# Patient Record
Sex: FEMALE | Race: WHITE | Employment: FULL TIME | URBAN - NONMETROPOLITAN AREA
[De-identification: names, ages, dates, MRNs, and addresses within clinical notes are randomized per-mention and may not be internally consistent; named-entity substitution may affect disease eponyms.]

---

## 2019-12-10 ENCOUNTER — HOSPITAL ENCOUNTER (OUTPATIENT)
Dept: MAMMOGRAPHY | Age: 54
Discharge: HOME | End: 2019-12-10
Payer: COMMERCIAL

## 2019-12-10 DIAGNOSIS — Z12.39 BREAST CANCER SCREENING: ICD-10-CM

## 2019-12-10 PROCEDURE — 77063 BREAST TOMOSYNTHESIS BI: CPT

## 2020-12-16 ENCOUNTER — HOSPITAL ENCOUNTER (OUTPATIENT)
Dept: MAMMOGRAPHY | Age: 55
Discharge: HOME | End: 2020-12-16
Payer: COMMERCIAL

## 2020-12-16 PROCEDURE — 77063 BREAST TOMOSYNTHESIS BI: CPT

## 2022-01-14 ENCOUNTER — HOSPITAL ENCOUNTER (OUTPATIENT)
Dept: GENERAL RADIOLOGY | Age: 57
Discharge: HOME | End: 2022-01-14
Payer: COMMERCIAL

## 2022-01-14 ENCOUNTER — HOSPITAL ENCOUNTER (OUTPATIENT)
Age: 57
Discharge: HOME | End: 2022-01-14
Payer: COMMERCIAL

## 2022-01-14 DIAGNOSIS — Z01.818 PREOP TESTING: ICD-10-CM

## 2022-01-14 DIAGNOSIS — M20.11 HALLUX VALGUS (ACQUIRED), RIGHT FOOT: ICD-10-CM

## 2022-01-14 DIAGNOSIS — M20.41 HAMMER TOE OF RIGHT FOOT: ICD-10-CM

## 2022-01-14 LAB
ALBUMIN SERPL-MCNC: 4.6 G/DL (ref 3.5–5.1)
ALP BLD-CCNC: 76 U/L (ref 38–126)
ALT SERPL-CCNC: 9 U/L (ref 11–66)
ANION GAP SERPL CALCULATED.3IONS-SCNC: 10 MEQ/L (ref 8–16)
AST SERPL-CCNC: 14 U/L (ref 5–40)
BILIRUB SERPL-MCNC: 0.4 MG/DL (ref 0.3–1.2)
BUN BLDV-MCNC: 17 MG/DL (ref 7–22)
CALCIUM SERPL-MCNC: 9.5 MG/DL (ref 8.5–10.5)
CHLORIDE BLD-SCNC: 104 MEQ/L (ref 98–111)
CO2: 25 MEQ/L (ref 23–33)
CREAT SERPL-MCNC: 0.6 MG/DL (ref 0.4–1.2)
EKG ATRIAL RATE: 64 BPM
EKG P AXIS: 65 DEGREES
EKG P-R INTERVAL: 162 MS
EKG Q-T INTERVAL: 408 MS
EKG QRS DURATION: 84 MS
EKG QTC CALCULATION (BAZETT): 420 MS
EKG R AXIS: 51 DEGREES
EKG T AXIS: 46 DEGREES
EKG VENTRICULAR RATE: 64 BPM
ERYTHROCYTE [DISTWIDTH] IN BLOOD BY AUTOMATED COUNT: 12.1 % (ref 11.5–14.5)
ERYTHROCYTE [DISTWIDTH] IN BLOOD BY AUTOMATED COUNT: 40.3 FL (ref 35–45)
GFR SERPL CREATININE-BSD FRML MDRD: > 90 ML/MIN/1.73M2
GLUCOSE BLD-MCNC: 78 MG/DL (ref 70–108)
HCT VFR BLD CALC: 43 % (ref 37–47)
HEMOGLOBIN: 14.6 GM/DL (ref 12–16)
MCH RBC QN AUTO: 31 PG (ref 26–33)
MCHC RBC AUTO-ENTMCNC: 34 GM/DL (ref 32.2–35.5)
MCV RBC AUTO: 91.3 FL (ref 81–99)
PLATELET # BLD: 205 THOU/MM3 (ref 130–400)
PMV BLD AUTO: 10 FL (ref 9.4–12.4)
POTASSIUM SERPL-SCNC: 4.3 MEQ/L (ref 3.5–5.2)
RBC # BLD: 4.71 MILL/MM3 (ref 4.2–5.4)
SODIUM BLD-SCNC: 139 MEQ/L (ref 135–145)
TOTAL PROTEIN: 7.3 G/DL (ref 6.1–8)
WBC # BLD: 7.1 THOU/MM3 (ref 4.8–10.8)

## 2022-01-14 PROCEDURE — 36415 COLL VENOUS BLD VENIPUNCTURE: CPT

## 2022-01-14 PROCEDURE — 80053 COMPREHEN METABOLIC PANEL: CPT

## 2022-01-14 PROCEDURE — 93005 ELECTROCARDIOGRAM TRACING: CPT | Performed by: PODIATRIST

## 2022-01-14 PROCEDURE — 85027 COMPLETE CBC AUTOMATED: CPT

## 2022-01-14 PROCEDURE — 71046 X-RAY EXAM CHEST 2 VIEWS: CPT

## 2022-01-19 RX ORDER — FLUOXETINE HYDROCHLORIDE 20 MG/1
20 CAPSULE ORAL DAILY
COMMUNITY
Start: 2021-12-30

## 2022-01-19 RX ORDER — ALPRAZOLAM 0.5 MG/1
TABLET ORAL 3 TIMES DAILY PRN
COMMUNITY
Start: 2021-12-30 | End: 2022-09-02 | Stop reason: DRUGHIGH

## 2022-01-19 RX ORDER — TRAZODONE HYDROCHLORIDE 100 MG/1
100 TABLET ORAL NIGHTLY
COMMUNITY
Start: 2021-08-30 | End: 2022-09-02 | Stop reason: DRUGHIGH

## 2022-01-19 NOTE — PROGRESS NOTES
In preparation for their surgical procedure above patient was screened for Obstructive Sleep Apnea (CONCEPCION) using the STOP-Bang Questionnaire by the Pre-Admission Testing department. This is a pre-surgical screening tool for patient safety and serves as a recommendation, this WILL NOT cause cancellation of surgery. STOP-Bang Questionnaire  * Do you currently see a pulmonologist?  No     If yes STOP, do not complete. Patient follows with Dr.     1.  Do you snore loudly (able to be heard in the next room)? No    2. Do you often feel tired or sleepy during the daytime? No       3. Has anyone ever told you that you stop breathing during your sleep? No    4. Do you have or are you being treated for high blood pressure? No      5. BMI more than 35? BMI (Calculated): 25.5        No    6. Age over 48 years? 61 y.o. Yes    7. Neck Circumference greater than 17 inches for male or 16 inches for female? Measured           (visits only)            Not Applicable    8. Gender Male? No      TOTAL SCORE: 1    CONCEPCION - Low Risk : Yes to 0 - 2 questions  CONCEPCION - Intermediate Risk : Yes to 3 - 4 questions  CONCEPCION - High Risk : Yes to 5 - 8 questions    Adapted from:   STOP Questionnaire: A Tool to Screen Patients for Obstructive Sleep Apnea   DEACON Gunn.C.P.C., Reyes Kline M.B.B.S., Lisa Boles M.D., Mj Marin. Meaghan Deutsch, Ph.D., Alanna Suarez M.B.B.S., He Deluca M.Sc., Luciana Hope M.D., Lorna Mendoza. JOHN Johnson.P.C.    Anesthesiology 2008; 925:150-75 Copyright 2008, the [address] of [employer]esthesi[employer], [facility] 37.   ----------------------------------------------------------------------------------------------------------------

## 2022-01-19 NOTE — PROGRESS NOTES
Covid screening questionnaire complete and negative for symptoms or exposure see chart for documentation.   Please notify your doctor if you develop any signs of illness  Please wear a mask when you come to the hospital    NPO after midnight  Bring insurance info and drivers license  Wear comfortable clean clothing  Do not bring jewelry  Shower night before and morning of surgery with a liquid antibacterial soap  Bring list of medications with dosage and how often taken  Follow all instructions given by your physician   needed at discharge  No visitors allowed in with patient at this time  Please bring and wear mask  Call [phone] for any questions

## 2022-01-26 ENCOUNTER — ANESTHESIA EVENT (OUTPATIENT)
Dept: OPERATING ROOM | Age: 57
End: 2022-01-26
Payer: COMMERCIAL

## 2022-01-26 ENCOUNTER — HOSPITAL ENCOUNTER (OUTPATIENT)
Age: 57
Setting detail: OUTPATIENT SURGERY
Discharge: HOME | End: 2022-01-26
Attending: PODIATRIST | Admitting: PODIATRIST
Payer: COMMERCIAL

## 2022-01-26 ENCOUNTER — ANESTHESIA (OUTPATIENT)
Dept: OPERATING ROOM | Age: 57
End: 2022-01-26
Payer: COMMERCIAL

## 2022-01-26 VITALS
SYSTOLIC BLOOD PRESSURE: 108 MMHG | TEMPERATURE: 98.3 F | HEART RATE: 88 BPM | OXYGEN SATURATION: 93 % | HEIGHT: 60 IN | WEIGHT: 125 LBS | RESPIRATION RATE: 16 BRPM | BODY MASS INDEX: 24.54 KG/M2 | DIASTOLIC BLOOD PRESSURE: 60 MMHG

## 2022-01-26 VITALS
RESPIRATION RATE: 17 BRPM | DIASTOLIC BLOOD PRESSURE: 59 MMHG | OXYGEN SATURATION: 97 % | SYSTOLIC BLOOD PRESSURE: 112 MMHG

## 2022-01-26 PROCEDURE — 6370000000 HC RX 637 (ALT 250 FOR IP)

## 2022-01-26 PROCEDURE — 3700000001 HC ADD 15 MINUTES (ANESTHESIA): Performed by: PODIATRIST

## 2022-01-26 PROCEDURE — C1713 ANCHOR/SCREW BN/BN,TIS/BN: HCPCS | Performed by: PODIATRIST

## 2022-01-26 PROCEDURE — 6360000002 HC RX W HCPCS: Performed by: REGISTERED NURSE

## 2022-01-26 PROCEDURE — 2500000003 HC RX 250 WO HCPCS: Performed by: REGISTERED NURSE

## 2022-01-26 PROCEDURE — 3600000004 HC SURGERY LEVEL 4 BASE: Performed by: PODIATRIST

## 2022-01-26 PROCEDURE — 6360000002 HC RX W HCPCS: Performed by: STUDENT IN AN ORGANIZED HEALTH CARE EDUCATION/TRAINING PROGRAM

## 2022-01-26 PROCEDURE — 88304 TISSUE EXAM BY PATHOLOGIST: CPT

## 2022-01-26 PROCEDURE — 3600000014 HC SURGERY LEVEL 4 ADDTL 15MIN: Performed by: PODIATRIST

## 2022-01-26 PROCEDURE — 7100000011 HC PHASE II RECOVERY - ADDTL 15 MIN: Performed by: PODIATRIST

## 2022-01-26 PROCEDURE — 2709999900 HC NON-CHARGEABLE SUPPLY: Performed by: PODIATRIST

## 2022-01-26 PROCEDURE — 2580000003 HC RX 258: Performed by: STUDENT IN AN ORGANIZED HEALTH CARE EDUCATION/TRAINING PROGRAM

## 2022-01-26 PROCEDURE — 7100000001 HC PACU RECOVERY - ADDTL 15 MIN: Performed by: PODIATRIST

## 2022-01-26 PROCEDURE — 7100000000 HC PACU RECOVERY - FIRST 15 MIN: Performed by: PODIATRIST

## 2022-01-26 PROCEDURE — 2720000010 HC SURG SUPPLY STERILE: Performed by: PODIATRIST

## 2022-01-26 PROCEDURE — 3700000000 HC ANESTHESIA ATTENDED CARE: Performed by: PODIATRIST

## 2022-01-26 PROCEDURE — 7100000010 HC PHASE II RECOVERY - FIRST 15 MIN: Performed by: PODIATRIST

## 2022-01-26 PROCEDURE — 2500000003 HC RX 250 WO HCPCS: Performed by: PODIATRIST

## 2022-01-26 DEVICE — STRAIGHT STAPLE ASSEMBLY, 25 X 20MM
Type: IMPLANTABLE DEVICE | Status: FUNCTIONAL
Brand: JAWS NITINOL STAPLE SYSTEM

## 2022-01-26 DEVICE — GRAFT HUM TISS W3XL6CM CRYOPRESERVED PLCNTA TISS UMB AMNION: Type: IMPLANTABLE DEVICE | Status: FUNCTIONAL

## 2022-01-26 DEVICE — KIT STPL BRDG 18MM LEG 18MM MAX CLSR 10.4MM BME ELITE: Type: IMPLANTABLE DEVICE | Status: FUNCTIONAL

## 2022-01-26 DEVICE — STAPLE BNE FIX W9XH10MM WIRE 1.5X1.5MM HND WRST NIT CONT: Type: IMPLANTABLE DEVICE | Status: FUNCTIONAL

## 2022-01-26 RX ORDER — MIDAZOLAM HYDROCHLORIDE 1 MG/ML
INJECTION INTRAMUSCULAR; INTRAVENOUS PRN
Status: DISCONTINUED | OUTPATIENT
Start: 2022-01-26 | End: 2022-01-26 | Stop reason: SDUPTHER

## 2022-01-26 RX ORDER — KETOROLAC TROMETHAMINE 30 MG/ML
INJECTION, SOLUTION INTRAMUSCULAR; INTRAVENOUS PRN
Status: DISCONTINUED | OUTPATIENT
Start: 2022-01-26 | End: 2022-01-26 | Stop reason: SDUPTHER

## 2022-01-26 RX ORDER — DEXAMETHASONE SODIUM PHOSPHATE 10 MG/ML
INJECTION, EMULSION INTRAMUSCULAR; INTRAVENOUS PRN
Status: DISCONTINUED | OUTPATIENT
Start: 2022-01-26 | End: 2022-01-26 | Stop reason: SDUPTHER

## 2022-01-26 RX ORDER — DIPHENHYDRAMINE HYDROCHLORIDE 50 MG/ML
12.5 INJECTION INTRAMUSCULAR; INTRAVENOUS
Status: DISCONTINUED | OUTPATIENT
Start: 2022-01-26 | End: 2022-01-26 | Stop reason: HOSPADM

## 2022-01-26 RX ORDER — LABETALOL 20 MG/4 ML (5 MG/ML) INTRAVENOUS SYRINGE
5 EVERY 10 MIN PRN
Status: DISCONTINUED | OUTPATIENT
Start: 2022-01-26 | End: 2022-01-26 | Stop reason: HOSPADM

## 2022-01-26 RX ORDER — METOCLOPRAMIDE HYDROCHLORIDE 5 MG/ML
10 INJECTION INTRAMUSCULAR; INTRAVENOUS
Status: DISCONTINUED | OUTPATIENT
Start: 2022-01-26 | End: 2022-01-26 | Stop reason: HOSPADM

## 2022-01-26 RX ORDER — SODIUM CHLORIDE 0.9 % (FLUSH) 0.9 %
10 SYRINGE (ML) INJECTION EVERY 12 HOURS SCHEDULED
Status: DISCONTINUED | OUTPATIENT
Start: 2022-01-26 | End: 2022-01-26 | Stop reason: HOSPADM

## 2022-01-26 RX ORDER — FENTANYL CITRATE 50 UG/ML
INJECTION, SOLUTION INTRAMUSCULAR; INTRAVENOUS PRN
Status: DISCONTINUED | OUTPATIENT
Start: 2022-01-26 | End: 2022-01-26 | Stop reason: SDUPTHER

## 2022-01-26 RX ORDER — SCOLOPAMINE TRANSDERMAL SYSTEM 1 MG/1
PATCH, EXTENDED RELEASE TRANSDERMAL
Status: DISCONTINUED
Start: 2022-01-26 | End: 2022-01-26 | Stop reason: HOSPADM

## 2022-01-26 RX ORDER — SODIUM CHLORIDE 9 MG/ML
INJECTION, SOLUTION INTRAVENOUS CONTINUOUS
Status: DISCONTINUED | OUTPATIENT
Start: 2022-01-26 | End: 2022-01-26 | Stop reason: HOSPADM

## 2022-01-26 RX ORDER — ONDANSETRON 2 MG/ML
4 INJECTION INTRAMUSCULAR; INTRAVENOUS EVERY 6 HOURS PRN
Status: DISCONTINUED | OUTPATIENT
Start: 2022-01-26 | End: 2022-01-26 | Stop reason: HOSPADM

## 2022-01-26 RX ORDER — LIDOCAINE HYDROCHLORIDE AND EPINEPHRINE 10; 10 MG/ML; UG/ML
INJECTION, SOLUTION INFILTRATION; PERINEURAL PRN
Status: DISCONTINUED | OUTPATIENT
Start: 2022-01-26 | End: 2022-01-26 | Stop reason: ALTCHOICE

## 2022-01-26 RX ORDER — PROMETHAZINE HYDROCHLORIDE 25 MG/ML
12.5 INJECTION, SOLUTION INTRAMUSCULAR; INTRAVENOUS
Status: DISCONTINUED | OUTPATIENT
Start: 2022-01-26 | End: 2022-01-26 | Stop reason: HOSPADM

## 2022-01-26 RX ORDER — OXYCODONE HYDROCHLORIDE 5 MG/1
5 TABLET ORAL EVERY 4 HOURS PRN
Status: DISCONTINUED | OUTPATIENT
Start: 2022-01-26 | End: 2022-01-26 | Stop reason: HOSPADM

## 2022-01-26 RX ORDER — PROMETHAZINE HYDROCHLORIDE 25 MG/ML
INJECTION, SOLUTION INTRAMUSCULAR; INTRAVENOUS PRN
Status: DISCONTINUED | OUTPATIENT
Start: 2022-01-26 | End: 2022-01-26 | Stop reason: SDUPTHER

## 2022-01-26 RX ORDER — SODIUM CHLORIDE 0.9 % (FLUSH) 0.9 %
10 SYRINGE (ML) INJECTION PRN
Status: DISCONTINUED | OUTPATIENT
Start: 2022-01-26 | End: 2022-01-26 | Stop reason: HOSPADM

## 2022-01-26 RX ORDER — MEPERIDINE HYDROCHLORIDE 25 MG/ML
12.5 INJECTION INTRAMUSCULAR; INTRAVENOUS; SUBCUTANEOUS EVERY 5 MIN PRN
Status: DISCONTINUED | OUTPATIENT
Start: 2022-01-26 | End: 2022-01-26 | Stop reason: HOSPADM

## 2022-01-26 RX ORDER — ONDANSETRON 2 MG/ML
INJECTION INTRAMUSCULAR; INTRAVENOUS PRN
Status: DISCONTINUED | OUTPATIENT
Start: 2022-01-26 | End: 2022-01-26 | Stop reason: SDUPTHER

## 2022-01-26 RX ORDER — SODIUM CHLORIDE 9 MG/ML
25 INJECTION, SOLUTION INTRAVENOUS PRN
Status: DISCONTINUED | OUTPATIENT
Start: 2022-01-26 | End: 2022-01-26 | Stop reason: HOSPADM

## 2022-01-26 RX ORDER — FENTANYL CITRATE 50 UG/ML
50 INJECTION, SOLUTION INTRAMUSCULAR; INTRAVENOUS EVERY 5 MIN PRN
Status: DISCONTINUED | OUTPATIENT
Start: 2022-01-26 | End: 2022-01-26 | Stop reason: HOSPADM

## 2022-01-26 RX ORDER — EPHEDRINE SULFATE/0.9% NACL/PF 50 MG/5 ML
SYRINGE (ML) INTRAVENOUS PRN
Status: DISCONTINUED | OUTPATIENT
Start: 2022-01-26 | End: 2022-01-26 | Stop reason: SDUPTHER

## 2022-01-26 RX ORDER — OXYCODONE HYDROCHLORIDE 5 MG/1
10 TABLET ORAL EVERY 4 HOURS PRN
Status: DISCONTINUED | OUTPATIENT
Start: 2022-01-26 | End: 2022-01-26 | Stop reason: HOSPADM

## 2022-01-26 RX ORDER — BUPIVACAINE HYDROCHLORIDE 5 MG/ML
INJECTION, SOLUTION EPIDURAL; INTRACAUDAL PRN
Status: DISCONTINUED | OUTPATIENT
Start: 2022-01-26 | End: 2022-01-26 | Stop reason: ALTCHOICE

## 2022-01-26 RX ORDER — ESMOLOL HYDROCHLORIDE 10 MG/ML
INJECTION INTRAVENOUS PRN
Status: DISCONTINUED | OUTPATIENT
Start: 2022-01-26 | End: 2022-01-26 | Stop reason: SDUPTHER

## 2022-01-26 RX ORDER — PROPOFOL 10 MG/ML
INJECTION, EMULSION INTRAVENOUS PRN
Status: DISCONTINUED | OUTPATIENT
Start: 2022-01-26 | End: 2022-01-26 | Stop reason: SDUPTHER

## 2022-01-26 RX ORDER — ONDANSETRON 4 MG/1
4 TABLET, ORALLY DISINTEGRATING ORAL EVERY 8 HOURS PRN
Status: DISCONTINUED | OUTPATIENT
Start: 2022-01-26 | End: 2022-01-26 | Stop reason: HOSPADM

## 2022-01-26 RX ORDER — FENTANYL CITRATE 50 UG/ML
25 INJECTION, SOLUTION INTRAMUSCULAR; INTRAVENOUS EVERY 5 MIN PRN
Status: DISCONTINUED | OUTPATIENT
Start: 2022-01-26 | End: 2022-01-26 | Stop reason: HOSPADM

## 2022-01-26 RX ORDER — SCOLOPAMINE TRANSDERMAL SYSTEM 1 MG/1
1 PATCH, EXTENDED RELEASE TRANSDERMAL
Status: DISCONTINUED | OUTPATIENT
Start: 2022-01-26 | End: 2022-01-26 | Stop reason: HOSPADM

## 2022-01-26 RX ORDER — LIDOCAINE HYDROCHLORIDE 20 MG/ML
INJECTION, SOLUTION EPIDURAL; INFILTRATION; INTRACAUDAL; PERINEURAL PRN
Status: DISCONTINUED | OUTPATIENT
Start: 2022-01-26 | End: 2022-01-26 | Stop reason: SDUPTHER

## 2022-01-26 RX ADMIN — CEFAZOLIN 2000 MG: 10 INJECTION, POWDER, FOR SOLUTION INTRAVENOUS at 08:55

## 2022-01-26 RX ADMIN — PROPOFOL 20 MG: 10 INJECTION, EMULSION INTRAVENOUS at 09:00

## 2022-01-26 RX ADMIN — ESMOLOL HYDROCHLORIDE 20 MG: 10 INJECTION, SOLUTION INTRAVENOUS at 11:27

## 2022-01-26 RX ADMIN — ESMOLOL HYDROCHLORIDE 20 MG: 10 INJECTION, SOLUTION INTRAVENOUS at 11:07

## 2022-01-26 RX ADMIN — PROPOFOL 80 MG: 10 INJECTION, EMULSION INTRAVENOUS at 10:08

## 2022-01-26 RX ADMIN — LIDOCAINE HYDROCHLORIDE 40 MG: 20 INJECTION, SOLUTION EPIDURAL; INFILTRATION; INTRACAUDAL; PERINEURAL at 08:49

## 2022-01-26 RX ADMIN — PROPOFOL 80 MG: 10 INJECTION, EMULSION INTRAVENOUS at 09:05

## 2022-01-26 RX ADMIN — FENTANYL CITRATE 25 MCG: 50 INJECTION, SOLUTION INTRAMUSCULAR; INTRAVENOUS at 10:42

## 2022-01-26 RX ADMIN — PROPOFOL 100 MG: 10 INJECTION, EMULSION INTRAVENOUS at 08:49

## 2022-01-26 RX ADMIN — Medication 20 MG: at 10:21

## 2022-01-26 RX ADMIN — FENTANYL CITRATE 50 MCG: 50 INJECTION, SOLUTION INTRAMUSCULAR; INTRAVENOUS at 09:04

## 2022-01-26 RX ADMIN — FENTANYL CITRATE 50 MCG: 50 INJECTION, SOLUTION INTRAMUSCULAR; INTRAVENOUS at 11:17

## 2022-01-26 RX ADMIN — PROPOFOL 30 MG: 10 INJECTION, EMULSION INTRAVENOUS at 10:33

## 2022-01-26 RX ADMIN — PROPOFOL 50 MG: 10 INJECTION, EMULSION INTRAVENOUS at 10:49

## 2022-01-26 RX ADMIN — FENTANYL CITRATE 25 MCG: 50 INJECTION, SOLUTION INTRAMUSCULAR; INTRAVENOUS at 10:33

## 2022-01-26 RX ADMIN — FENTANYL CITRATE 50 MCG: 50 INJECTION, SOLUTION INTRAMUSCULAR; INTRAVENOUS at 08:46

## 2022-01-26 RX ADMIN — ESMOLOL HYDROCHLORIDE 20 MG: 10 INJECTION, SOLUTION INTRAVENOUS at 11:15

## 2022-01-26 RX ADMIN — FENTANYL CITRATE 25 MCG: 50 INJECTION, SOLUTION INTRAMUSCULAR; INTRAVENOUS at 11:41

## 2022-01-26 RX ADMIN — KETOROLAC TROMETHAMINE 30 MG: 30 INJECTION, SOLUTION INTRAMUSCULAR; INTRAVENOUS at 10:58

## 2022-01-26 RX ADMIN — ONDANSETRON 4 MG: 2 INJECTION INTRAMUSCULAR; INTRAVENOUS at 08:49

## 2022-01-26 RX ADMIN — ESMOLOL HYDROCHLORIDE 20 MG: 10 INJECTION, SOLUTION INTRAVENOUS at 11:35

## 2022-01-26 RX ADMIN — FENTANYL CITRATE 50 MCG: 50 INJECTION, SOLUTION INTRAMUSCULAR; INTRAVENOUS at 10:08

## 2022-01-26 RX ADMIN — Medication 20 MG: at 09:16

## 2022-01-26 RX ADMIN — SODIUM CHLORIDE: 9 INJECTION, SOLUTION INTRAVENOUS at 08:46

## 2022-01-26 RX ADMIN — DEXAMETHASONE SODIUM PHOSPHATE 10 MG: 10 INJECTION, EMULSION INTRAMUSCULAR; INTRAVENOUS at 08:49

## 2022-01-26 RX ADMIN — Medication 10 MG: at 10:18

## 2022-01-26 RX ADMIN — MIDAZOLAM 2 MG: 1 INJECTION INTRAMUSCULAR; INTRAVENOUS at 08:46

## 2022-01-26 RX ADMIN — PROPOFOL 30 MCG/KG/MIN: 10 INJECTION, EMULSION INTRAVENOUS at 11:10

## 2022-01-26 RX ADMIN — PROMETHAZINE HYDROCHLORIDE 6.25 MG: 25 INJECTION INTRAMUSCULAR; INTRAVENOUS at 09:09

## 2022-01-26 RX ADMIN — FENTANYL CITRATE 25 MCG: 50 INJECTION, SOLUTION INTRAMUSCULAR; INTRAVENOUS at 11:55

## 2022-01-26 RX ADMIN — SODIUM CHLORIDE: 9 INJECTION, SOLUTION INTRAVENOUS at 10:25

## 2022-01-26 ASSESSMENT — PAIN SCALES - GENERAL: PAINLEVEL_OUTOF10: 0

## 2022-01-26 ASSESSMENT — PULMONARY FUNCTION TESTS
PIF_VALUE: 2
PIF_VALUE: 0
PIF_VALUE: 2
PIF_VALUE: 2
PIF_VALUE: 1
PIF_VALUE: 2
PIF_VALUE: 1
PIF_VALUE: 2
PIF_VALUE: 3
PIF_VALUE: 2
PIF_VALUE: 13
PIF_VALUE: 2
PIF_VALUE: 1
PIF_VALUE: 2
PIF_VALUE: 0
PIF_VALUE: 2
PIF_VALUE: 2
PIF_VALUE: 13
PIF_VALUE: 2
PIF_VALUE: 3
PIF_VALUE: 2
PIF_VALUE: 3
PIF_VALUE: 2
PIF_VALUE: 9
PIF_VALUE: 2
PIF_VALUE: 1
PIF_VALUE: 2
PIF_VALUE: 3
PIF_VALUE: 2
PIF_VALUE: 0
PIF_VALUE: 2
PIF_VALUE: 1
PIF_VALUE: 2
PIF_VALUE: 3
PIF_VALUE: 2
PIF_VALUE: 3
PIF_VALUE: 2
PIF_VALUE: 3

## 2022-01-26 ASSESSMENT — PAIN - FUNCTIONAL ASSESSMENT: PAIN_FUNCTIONAL_ASSESSMENT: 0-10

## 2022-01-26 NOTE — ANESTHESIA PRE PROCEDURE
Department of Anesthesiology  Preprocedure Note       Name:  Arvind Lockwood   Age:  61 y.o.  :  1965                                          MRN:  [id number]         Date:  2022      Surgeon: Jonnie Rangel):  Edilberto Byers DPM    Procedure: Procedure(s):  1ST METATARSOCUNEIFORM ARTHRODESIS WITH TITANIUM WEDGE STAPLES RIGHT FOOT DISTAL CHEVRON BUNIONECTOMY WITH SCREW FIXATION RIGHT FOOT POSSIBLE AKIN OSTEOTOMY WITH STAPLE FIXATION RIGHT FOOT PERCUTANEOUS FLEXOR TENOTOMY DIGIT 2, 3, 4, 5 RIGHT FOOT EXCISION OF PLANTAR FIBROMA RIGHT FOOT    Medications prior to admission:   Prior to Admission medications    Medication Sig Start Date End Date Taking? Authorizing Provider   ALPRAZolam Teagan Riling) 0.5 MG tablet 3 times daily as needed. 21  Yes Historical Provider, MD   traZODone (DESYREL) 100 MG tablet Take 100 mg by mouth nightly 21  Yes Historical Provider, MD   FLUoxetine (PROZAC) 20 MG capsule Take 20 mg by mouth daily 21  Yes Historical Provider, MD       Current medications:    Current Facility-Administered Medications   Medication Dose Route Frequency Provider Last Rate Last Admin    sodium chloride flush 0.9 % injection 10 mL  10 mL IntraVENous 2 times per day Roxann Lacrosse, DPM        sodium chloride flush 0.9 % injection 10 mL  10 mL IntraVENous PRN Roxann Lacrosse, DPM        0.9 % sodium chloride infusion  25 mL IntraVENous PRN Roxann Lacrosse, DPM        ceFAZolin (ANCEF) 2000 mg in dextrose 5 % 50 mL IVPB  2,000 mg IntraVENous On Call to [facility], [facility]           Allergies: Allergies   Allergen Reactions    Doxycycline Nausea And Vomiting       Problem List:  There is no problem list on file for this patient.       Past Medical History:        Diagnosis Date    PFO (patent foramen ovale)     congential and closed    PONV (postoperative nausea and vomiting)        Past Surgical History:        Procedure Laterality Date     SECTION      x2    COLONOSCOPY [facility]    Drug/Infectious Status (If Applicable):  No results found for: HIV, HEPCAB    COVID-19 Screening (If Applicable): No results found for: COVID19        Anesthesia Evaluation  Patient summary reviewed   history of anesthetic complications: PONV. Airway: Mallampati: II  TM distance: >3 FB   Neck ROM: full  Mouth opening: > = 3 FB Dental: normal exam         Pulmonary:normal exam                               Cardiovascular:                      Neuro/Psych:               GI/Hepatic/Renal:             Endo/Other:                     Abdominal:             Vascular: Other Findings:             Anesthesia Plan      general     ASA 2       Induction: intravenous. MIPS: Postoperative opioids intended and Prophylactic antiemetics administered. Anesthetic plan and risks discussed with patient and spouse.       Plan discussed with CRNA and surgical team.                  Glen Dias MD   1/26/2022

## 2022-01-26 NOTE — PROGRESS NOTES
1216 Patient arrived to phase I via cart. Spontaneous respiraitons even and unlabored. Placed on monitor--VSS. Report received from Melissa Ochoa Dr Assessment completed. Patient remains drowsy and unarousable. IV infusing-- no complications. Unable to assess pain at this time D/T decreased LOC--will monitor. Right foot is elevated on 2 pillows. Toes are pink, warm, and dry. [facility] remains at bedside  1229 O2 decreased to 5 LPM via NC  1230 Pt. Awake and talking, but easily falls back to sleep. 1231 Pt. forgetful and needs reoriented when she wakes. 1232 O2 removed per pt. SPO2 92% on RA.  1234 Pt. Denies pain at this time, just states that she is tired. 1235 SPO2 remains at 92% on RA.  1238 Pt. Requesting to void. RN offered a bedpan. Pt. Agreed. 1239 Pt. Voiding in bedpan without complications  5818 Pt. Requesting water. Water provided. Pt. Sipping on water, and denies any N/V. Pt. Denies pain at this time. 1248 Pt. Able to move toes, but denies sensation. 1252 Pt. Alert and oriented. 1257 Phase I care complete. Pt. Moved to room 1 for Phase II care  1304 Pt. Alert and oriented X4 and talking with her . Pt. Denies pain at this time and requesting a snack. 1310 Snack and drink provided for pt.  1315 Pt. Tolerating snack without complaints. 1325 Pt. States she is ready to be discharged. IV removed. [address] at bedside assisting pt to get dressed. 1333 Pt. States she has to void prior to discharge. 1334 PT. Assisted into wheelchair and assisted to the BR.  1340 Pt. Voided and assisted back to wheelchair with stand by assist of RN. 1348 Discharge instructions reviewed with the pt. And her Spouse. All questions answered and an ice pack provided. 1352 Pt. Transferred to private vehicle in stable condition via wheelchair. RN assisted the pt. Into her vehicle. Pt. Tolerated activity well and without complaints.

## 2022-01-26 NOTE — BRIEF OP NOTE
20MM  STAPLE STR ASSEMB 25MM X 20MM  PARAGON 28-WD [id number] Right 1 Implanted   STAPLE BNE FIX [id number] WIRE 1.5X1.5MM HND WRST NIT CONT  STAPLE BNE FIX [id number] WIRE 1.5X1.5MM HND WRST NIT CONT  [employer] [place]-[employer] [id number] Right 1 Implanted         Drains: * No LDAs found *    Suture: 3-0 vicryl, 4-0 prolene    Injectable: 40cc 1:1 mix of 0.5% marcaine plain and 1% lidocaine with epi    Findings: Consistent with diagnosis    Electronically signed by Wesley Quan DPM on 1/26/2022 at 12:20 PM

## 2022-01-26 NOTE — OP NOTE
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 SUBSEQUENT ENCOUNTER HAMMER TOE(S) ACQUIRED RIGHT FOOT OTHER DEFORMITIES OF TOE(S) ACQUIRED RIGHT FOOT PLANTAR FIBROMATOSIS/PSUEDOSARCOMATOUS FIBROMATOSIS    Post-Op Diagnosis: Same       Procedure(s):  1ST METATARSOCUNEIFORM ARTHRODESIS WITH TITANIUM WEDGE STAPLES RIGHT FOOT, HERRON BUNIONECTOMY, FOOT AKIN OSTEOTOMY WITH STAPLE FIXATION RIGHT FOOT PERCUTANEOUS FLEXOR TENOTOMY DIGIT 2, 3, 4, 5 RIGHT FOOT EXCISION OF PLANTAR FIBROMA RIGHT FOOT    Surgeon(s):  Edilberto Guzman DPM    Assistant:  Resident: Fior Gardner DPM; Scottie Shin DPM    Anesthesia: General    Estimated Blood Loss (mL): Minimal    Complications: None    Specimens:   ID Type Source Tests Collected by Time Destination   A : Plantar Fibroma Right Foot Tissue Foot SURGICAL PATHOLOGY Yue Osman DPM 1/26/2022 8864        Implants:  Implant Name Type Inv.  Item Serial No.  Lot No. LRB No. Used Action   GRAFT HUM TISS [id number] CRYOPRESERVED PLCNTA TISS UMB AMNION - [id number]  GRAFT HUM TISS [id number] CRYOPRESERVED PLCNTA TISS UMB AMNION [id number] [facility]41[facility] [id number] Right 1 Implanted   Lapidus Lengthening Bone Segment, 10mm    ADDITIVE ORTHOPAEDICS [facility] [id number] Right 1 Implanted   KIT STPL BRDG 18MM LEG 18MM MAX CLSR 10.4MM BME ELITE  KIT STPL BRDG 18MM LEG 18MM MAX CLSR 10.4MM BME ELITE  [facility] [employer] [facility] [id number] Right 1 Implanted   KIT STPL BRDG 18MM LEG 18MM MAX CLSR 10.4MM BME ELITE  KIT STPL BRDG 18MM LEG 18MM MAX CLSR 10.4MM BME ELITE  [facility] [employer] [facility] [id number] Right 1 Implanted   STAPLE STR ASSEMB 25MM X 20MM  STAPLE STR ASSEMB 25MM X 20MM  PARAGON [facility] [id number] Right 1 Implanted   STAPLE STR ASSEMB 25MM X 20MM  STAPLE STR ASSEMB 25MM X 20MM AZAR [facility] [id number] Right 1 Implanted   STAPLE BNE FIX [id number] WIRE 1.5X1.5MM HND WRST NIT CONT  STAPLE BNE FIX [id number] WIRE 1.5X1.5MM HND WRST NIT CONT  [employer]- [id number] Right 1 Implanted         Drains: * No LDAs found *    Suture: 3-0 vicryl, 4-0 prolene    Injectable: 40cc 1:1 mix of 0.5% marcaine plain and 1% lidocaine with epi    Findings: Consistent with diagnosis    Indications: Patient is a 61 y.o. female with a chief complaint of hallux valgus right foot, hammertoe deformity digits 2-5 of the right foot, and plantar fibromas of the right foot who is being seen by Dr. Phill Loyola and being treated for these conditions. At this time all conservative options have been exhausted and surgical intervention is necessary. The procedure has been explained to the patient and they understand the risks, benefits and possible complications including painful scar, infection, need for further surgery, malunion, non-union, delayed union, residual deformity, DVT, PE, loss of limb, loss of life. No promises have been made as to surgical outcome. Procedure: The patient was transported from the pre-op holding to the operating room and placed in a supine position. A pneumatic thigh tourniquet was applied to the right thigh. The right foot was then prepped and draped in the normal aspetic manner. Attention was directed to the plantar aspect of the right foot. A C-shaped incision extending from just proximal to the first metatarsal head to just proximal to the proximal plantar fibroma with medial concavity was made following this,  the incision was bluntly carried down to the level of the plantar fascia utilizing a Metzenbaum scissors. A key elevator was used to resect the subcutaneous fat off of the plantar fascia. The plantar fibromas were then visualized. And then the distal, proximal, medial, lateral borders of the tissue to be excised were cut.   The section of the plantar fascia was then carefully raised from the underlying flexor digitorum brevis muscle, making sure to avoid any nerve branches in the area. No nerve was visualized during the procedure. The plantar fascia was then sent to pathology for analysis. A Stri-Dex graft was then anchored into the resulting soft tissue void utilizing 3-0 Vicryl. The skin was then closed utilizing 4-0 Prolene. The right foot was then exsanguinated with an esmark and the tourniquet was inflated to 300 mmHg. Attention was directed to the dorsal medial aspect of the right foot. An incision was made from just proximal to the first metatarsocuneiform joint to the first metatarsophalangeal joint. The incision was bluntly carried down to the level of the periosteum utilizing Metzenbaum scissors. All neurovascular structures were carefully retracted, and all small bleeding vessels were ligated utilizing electrocautery. The periosteum over the first metatarsocuneiform joint was sharply incised utilizing a 15 blade. The periosteum was released from both sides of the joint. Sagittal saw was used to resect a wedge off the medial cuneiform, as well as the base of the first metatarsal.  The 2 sites were then opposed and seem to fit well together. However, the first metatarsal was deemed to be short, so the decision was made to utilize a titanium spacer. A 10 mm trial Lapidus wedge was placed into the void. The foot was then checked utilizing fluoroscopy and the 10 mm wedge was seen to be an appropriate size for restoring length of the first metatarsal.  A 10 mm [place] 28 Lapidus wedge was then inserted between the medial cuneiform and the first metatarsal.  This was checked under fluoroscopy and good reduction of the deformity was seen to be present. This was initially fixated utilizing 218 mm Synthes staples that were inserted according to the 's instructions.   However, there is fracturing that was present upon insertion of the medial staple. Because of this, there was recurrence of the bunion deformity. Sagittal saw was used to resect further off the medial cuneiform and first metatarsal base so as to reduce the deformity. Following this, the fusion site was then fixated utilizing 2 [place] 2825 mm staples. This was checked under fluoroscopy, and adequate reduction of the deformity was visualized. The fusion site was seen to be stable as well. Following this, there was still seem to be a medial eminence of the first metatarsal head. A Mayers bunion ectomy was performed through this eminence. The periosteum overlying the first metatarsal head was sent sharply incised. The medial eminence of the first metatarsal was then exposed and resected utilizing a sagittal saw. There was seen to be a residual deformity in the proximal phalanx. The incision was then extended over the base of the proximal phalanx. The periosteum was elevated utilizing a [place] elevator. A sagittal saw was then used to make an osteotomy in the midshaft of the proximal phalanx. This was feathered down until appropriate reduction of the deformity was visualized. This was then fixated utilizing a 9 mm Synthes staple. The osteotomy site was seen to be stable. Following this, attention was directed to the lesser digits 2 through 5 of the right foot. There was seen to be flexible hammertoe contracture of the stitches. A Norman blade was used to perform a percutaneous flexor tenotomy of digits 2, 3, 4, 5 of the right foot. After this, digits 2 through 5 of the right foot were seen no longer have the contracture. The incision was flushed with copious amounts of sterile saline. The subcutaneous tissues were re-appoximated with 3-0 vicryl. The skin was closed using 4-0 prolene. A post-op injection of 40cc 1:1 mix of 0.5% marcaine plain and 1% lidocaine with epi was injected.   The incision was dressed with adaptic, 4x4's, kerlix, etc.  The pneumatic thigh

## 2022-01-26 NOTE — H&P
[address] [address]  History and Physical Update    Pt Name: Chelsie Ridmaxx  MRN: [id number]  YOB: 1965  Date of evaluation: 1/26/2022    [x] I have examined the patient and reviewed the H&P/Consult and there are no changes to the patient or plans.     [] I have examined the patient and reviewed the H&P/Consult and have noted the following changes:        Derek Robbins DPM DPM  Electronically signed 1/26/2022 at 7:22 AM

## 2022-01-26 NOTE — ANESTHESIA POSTPROCEDURE EVALUATION
Department of Anesthesiology  Postprocedure Note    Patient: Brandon Trinidad  MRN: [id number]  YOB: 1965  Date of evaluation: 1/26/2022  Time:  1:09 PM     Procedure Summary     Date: 01/26/22 Room / Location: [address] [place] [address] 01 / Veronica Henderson    Anesthesia Start: 6345 Anesthesia Stop: 8226    Procedure: 1ST METATARSOCUNEIFORM ARTHRODESIS WITH TITANIUM WEDGE STAPLES RIGHT FOOT DISTAL CHEVRON BUNIONECTOMY WITH SCREW FIXATION RIGHT FOOT AKIN OSTEOTOMY WITH STAPLE FIXATION RIGHT FOOT PERCUTANEOUS FLEXOR TENOTOMY DIGIT 2, 3, 4, 5 RIGHT FOOT EXCISION OF PLANTAR FIBROMA RIGHT FOOT (Right ) Diagnosis: (HALLUX VALGUS ACQUIRED RIGHT FOOT SUBLUXATION OF TARSOMETATARSAL JOINT OF RIGHT FOOT SUBSEQUENT ENCOUNTER HAMMER TOE(S) ACQUIRED RIGHT FOOT OTHER DEFORMITIES OF TOE(S) ACQUIRED RIGHT FOOT PLANTAR FIBROMATOSIS/PSUEDOSARCOMATOUS FIBROMATOSIS)    Surgeons: Sonam Slater DPM Responsible Provider: Jeanine Perdomo MD    Anesthesia Type: general ASA Status: 2          Anesthesia Type: general    Monty Phase I: Monty Score: 10    Monty Phase II: Monty Score: 10    Last vitals: Reviewed and per EMR flowsheets. Anesthesia Post Evaluation    Patient location during evaluation: PACU  Patient participation: complete - patient participated  Level of consciousness: awake and alert  Airway patency: patent  Nausea & Vomiting: no nausea and no vomiting  Complications: no  Cardiovascular status: hemodynamically stable  Respiratory status: acceptable  Hydration status: euvolemic      [address]  POST-ANESTHESIA NOTE       Name:  Brandon Trinidad                                         Age:  61 y.o.   MRN:  [id number]      Last Vitals:  /60   Pulse 88   Temp 98.3 °F (36.8 °C) (Temporal)   Resp 16   Ht 4' 11.5\" (1.511 m)   Wt 125 lb (56.7 kg)   SpO2 93%   BMI 24.82 kg/m²   Patient Vitals for the past 4 hrs:   BP Temp Temp src Pulse Resp SpO2   01/26/22 1305 108/60   88 16 93 %   01/26/22 1255 (!) 107/56   96 12 95 %   01/26/22 1250 (!) 111/57   97 20 94 %   01/26/22 1245 (!) 110/58   106 15 94 %   01/26/22 1240 (!) 101/59   116 13 92 %   01/26/22 1235 (!) 111/55   107 17 92 %   01/26/22 1230 (!) 108/57   123 16 96 %   01/26/22 1225 (!) 111/52   100 17 98 %   01/26/22 1222 (!) 115/55   97 17 98 %   01/26/22 1218 (!) 111/55 98.3 °F (36.8 °C) Temporal 107 16 97 %       Level of Consciousness:  Awake    Respiratory:  Stable    Oxygen Saturation:  Stable    Cardiovascular:  Stable    Hydration:  Adequate    PONV:  Stable    Post-op Pain:  Adequate analgesia    Post-op Assessment:  No apparent anesthetic complications    Additional Follow-Up / Treatment / Comment:  None    Azael Sun MD  January 26, 2022   1:09 PM

## 2022-04-06 ENCOUNTER — HOSPITAL ENCOUNTER (OUTPATIENT)
Dept: MAMMOGRAPHY | Age: 57
Discharge: HOME | End: 2022-04-06
Payer: COMMERCIAL

## 2022-04-06 DIAGNOSIS — Z12.31 VISIT FOR SCREENING MAMMOGRAM: ICD-10-CM

## 2022-04-06 PROCEDURE — 77063 BREAST TOMOSYNTHESIS BI: CPT

## 2022-08-22 ENCOUNTER — NURSE ONLY (OUTPATIENT)
Dept: LAB | Age: 57
End: 2022-08-22

## 2022-08-22 LAB
ALBUMIN SERPL-MCNC: 4.9 G/DL (ref 3.5–5.1)
ALP BLD-CCNC: 62 U/L (ref 38–126)
ALT SERPL-CCNC: 12 U/L (ref 11–66)
ANION GAP SERPL CALCULATED.3IONS-SCNC: 11 MEQ/L (ref 8–16)
AST SERPL-CCNC: 17 U/L (ref 5–40)
BILIRUB SERPL-MCNC: 0.5 MG/DL (ref 0.3–1.2)
BUN BLDV-MCNC: 15 MG/DL (ref 7–22)
CALCIUM SERPL-MCNC: 10.1 MG/DL (ref 8.5–10.5)
CHLORIDE BLD-SCNC: 107 MEQ/L (ref 98–111)
CO2: 27 MEQ/L (ref 23–33)
CREAT SERPL-MCNC: 0.7 MG/DL (ref 0.4–1.2)
ERYTHROCYTE [DISTWIDTH] IN BLOOD BY AUTOMATED COUNT: 12.7 % (ref 11.5–14.5)
ERYTHROCYTE [DISTWIDTH] IN BLOOD BY AUTOMATED COUNT: 43.8 FL (ref 35–45)
GFR SERPL CREATININE-BSD FRML MDRD: 86 ML/MIN/1.73M2
GLUCOSE BLD-MCNC: 98 MG/DL (ref 70–108)
HCT VFR BLD CALC: 42.2 % (ref 37–47)
HEMOGLOBIN: 13.9 GM/DL (ref 12–16)
MCH RBC QN AUTO: 31.2 PG (ref 26–33)
MCHC RBC AUTO-ENTMCNC: 32.9 GM/DL (ref 32.2–35.5)
MCV RBC AUTO: 94.8 FL (ref 81–99)
PLATELET # BLD: 238 THOU/MM3 (ref 130–400)
PMV BLD AUTO: 10.8 FL (ref 9.4–12.4)
POTASSIUM SERPL-SCNC: 3.9 MEQ/L (ref 3.5–5.2)
RBC # BLD: 4.45 MILL/MM3 (ref 4.2–5.4)
SODIUM BLD-SCNC: 145 MEQ/L (ref 135–145)
TOTAL PROTEIN: 7.1 G/DL (ref 6.1–8)
WBC # BLD: 9.7 THOU/MM3 (ref 4.8–10.8)

## 2022-09-15 NOTE — PROGRESS NOTES
Patient instructed not to eat or drink anything after midnight the day before surgery. Take heart & blood pressure medications in the morning with a small sip of water. Please bring list of medications with the dosages & when you take them. If you do not  have a list bring the medications bottles with you. If having a MAC or general anesthetic you MUST have a . Bring photo ID & insurance information. Leave jewelry (watch ,rings, peircings) & other valuables, including extra cash, at home. Wear comfortable clean clothing. When showering or bathing the night before & morning of surgery please use  antibacterial soap. Follow any instructions given from Dr. Camarena  office.

## 2022-09-21 ENCOUNTER — ANESTHESIA (OUTPATIENT)
Dept: OPERATING ROOM | Age: 57
End: 2022-09-21
Payer: COMMERCIAL

## 2022-09-21 ENCOUNTER — HOSPITAL ENCOUNTER (OUTPATIENT)
Age: 57
Setting detail: OUTPATIENT SURGERY
Discharge: HOME | End: 2022-09-21
Attending: PODIATRIST | Admitting: PODIATRIST
Payer: COMMERCIAL

## 2022-09-21 ENCOUNTER — ANESTHESIA EVENT (OUTPATIENT)
Dept: OPERATING ROOM | Age: 57
End: 2022-09-21
Payer: COMMERCIAL

## 2022-09-21 VITALS
WEIGHT: 101.6 LBS | BODY MASS INDEX: 21.32 KG/M2 | DIASTOLIC BLOOD PRESSURE: 60 MMHG | OXYGEN SATURATION: 95 % | RESPIRATION RATE: 20 BRPM | HEART RATE: 77 BPM | HEIGHT: 58 IN | SYSTOLIC BLOOD PRESSURE: 125 MMHG | TEMPERATURE: 97.8 F

## 2022-09-21 DIAGNOSIS — M20.42 ACQUIRED HAMMER TOE OF LEFT FOOT: ICD-10-CM

## 2022-09-21 DIAGNOSIS — M72.2 PLANTAR FIBROMATOSIS: ICD-10-CM

## 2022-09-21 DIAGNOSIS — M20.12 HALLUX VALGUS (ACQUIRED), LEFT FOOT: ICD-10-CM

## 2022-09-21 PROCEDURE — C1889 IMPLANT/INSERT DEVICE, NOC: HCPCS | Performed by: PODIATRIST

## 2022-09-21 PROCEDURE — 88304 TISSUE EXAM BY PATHOLOGIST: CPT

## 2022-09-21 PROCEDURE — 7100000001 HC PACU RECOVERY - ADDTL 15 MIN: Performed by: PODIATRIST

## 2022-09-21 PROCEDURE — 6370000000 HC RX 637 (ALT 250 FOR IP)

## 2022-09-21 PROCEDURE — 3700000000 HC ANESTHESIA ATTENDED CARE: Performed by: PODIATRIST

## 2022-09-21 PROCEDURE — 7100000011 HC PHASE II RECOVERY - ADDTL 15 MIN: Performed by: PODIATRIST

## 2022-09-21 PROCEDURE — 2720000010 HC SURG SUPPLY STERILE: Performed by: PODIATRIST

## 2022-09-21 PROCEDURE — 6360000002 HC RX W HCPCS: Performed by: ANESTHESIOLOGY

## 2022-09-21 PROCEDURE — 3600000013 HC SURGERY LEVEL 3 ADDTL 15MIN: Performed by: PODIATRIST

## 2022-09-21 PROCEDURE — 2500000003 HC RX 250 WO HCPCS: Performed by: PODIATRIST

## 2022-09-21 PROCEDURE — 3700000001 HC ADD 15 MINUTES (ANESTHESIA): Performed by: PODIATRIST

## 2022-09-21 PROCEDURE — 7100000000 HC PACU RECOVERY - FIRST 15 MIN: Performed by: PODIATRIST

## 2022-09-21 PROCEDURE — 3600000003 HC SURGERY LEVEL 3 BASE: Performed by: PODIATRIST

## 2022-09-21 PROCEDURE — 7100000010 HC PHASE II RECOVERY - FIRST 15 MIN: Performed by: PODIATRIST

## 2022-09-21 PROCEDURE — 2709999900 HC NON-CHARGEABLE SUPPLY: Performed by: PODIATRIST

## 2022-09-21 PROCEDURE — C1713 ANCHOR/SCREW BN/BN,TIS/BN: HCPCS | Performed by: PODIATRIST

## 2022-09-21 PROCEDURE — 6370000000 HC RX 637 (ALT 250 FOR IP): Performed by: STUDENT IN AN ORGANIZED HEALTH CARE EDUCATION/TRAINING PROGRAM

## 2022-09-21 PROCEDURE — 2500000003 HC RX 250 WO HCPCS: Performed by: ANESTHESIOLOGY

## 2022-09-21 DEVICE — STAPLE INT W11XH10MM WIRE 1.5X1.5MM HND WRST NIT SPD CONT: Type: IMPLANTABLE DEVICE | Site: FOOT | Status: FUNCTIONAL

## 2022-09-21 DEVICE — KIT STPL BRDG 18MM LEG 18MM MAX CLSR 10.4MM BME ELITE: Type: IMPLANTABLE DEVICE | Site: FOOT | Status: FUNCTIONAL

## 2022-09-21 DEVICE — GRAFT HUM TISS W3XL6CM CRYOPRESERVED PLCNTA TISS UMB AMNION: Type: IMPLANTABLE DEVICE | Site: FOOT | Status: FUNCTIONAL

## 2022-09-21 RX ORDER — SODIUM CHLORIDE 0.9 % (FLUSH) 0.9 %
5-40 SYRINGE (ML) INJECTION EVERY 12 HOURS SCHEDULED
Status: DISCONTINUED | OUTPATIENT
Start: 2022-09-21 | End: 2022-09-21 | Stop reason: HOSPADM

## 2022-09-21 RX ORDER — SODIUM CHLORIDE 9 MG/ML
INJECTION, SOLUTION INTRAVENOUS CONTINUOUS
Status: DISCONTINUED | OUTPATIENT
Start: 2022-09-21 | End: 2022-09-21 | Stop reason: HOSPADM

## 2022-09-21 RX ORDER — MEPERIDINE HYDROCHLORIDE 25 MG/ML
12.5 INJECTION INTRAMUSCULAR; INTRAVENOUS; SUBCUTANEOUS ONCE
Status: DISCONTINUED | OUTPATIENT
Start: 2022-09-21 | End: 2022-09-21 | Stop reason: HOSPADM

## 2022-09-21 RX ORDER — SODIUM CHLORIDE 0.9 % (FLUSH) 0.9 %
5-40 SYRINGE (ML) INJECTION PRN
Status: DISCONTINUED | OUTPATIENT
Start: 2022-09-21 | End: 2022-09-21 | Stop reason: SDUPTHER

## 2022-09-21 RX ORDER — ONDANSETRON 2 MG/ML
4 INJECTION INTRAMUSCULAR; INTRAVENOUS EVERY 6 HOURS PRN
Status: DISCONTINUED | OUTPATIENT
Start: 2022-09-21 | End: 2022-09-21 | Stop reason: HOSPADM

## 2022-09-21 RX ORDER — LIDOCAINE HYDROCHLORIDE AND EPINEPHRINE BITARTRATE 20; .01 MG/ML; MG/ML
INJECTION, SOLUTION SUBCUTANEOUS PRN
Status: DISCONTINUED | OUTPATIENT
Start: 2022-09-21 | End: 2022-09-21 | Stop reason: ALTCHOICE

## 2022-09-21 RX ORDER — OXYCODONE HYDROCHLORIDE 5 MG/1
10 TABLET ORAL EVERY 4 HOURS PRN
Status: DISCONTINUED | OUTPATIENT
Start: 2022-09-21 | End: 2022-09-21 | Stop reason: HOSPADM

## 2022-09-21 RX ORDER — CEFAZOLIN SODIUM 1 G/3ML
INJECTION, POWDER, FOR SOLUTION INTRAMUSCULAR; INTRAVENOUS PRN
Status: DISCONTINUED | OUTPATIENT
Start: 2022-09-21 | End: 2022-09-21 | Stop reason: SDUPTHER

## 2022-09-21 RX ORDER — SCOLOPAMINE TRANSDERMAL SYSTEM 1 MG/1
PATCH, EXTENDED RELEASE TRANSDERMAL
Status: DISCONTINUED
Start: 2022-09-21 | End: 2022-09-21 | Stop reason: HOSPADM

## 2022-09-21 RX ORDER — SODIUM CHLORIDE 9 MG/ML
INJECTION, SOLUTION INTRAVENOUS PRN
Status: DISCONTINUED | OUTPATIENT
Start: 2022-09-21 | End: 2022-09-21 | Stop reason: SDUPTHER

## 2022-09-21 RX ORDER — PROPOFOL 10 MG/ML
INJECTION, EMULSION INTRAVENOUS PRN
Status: DISCONTINUED | OUTPATIENT
Start: 2022-09-21 | End: 2022-09-21 | Stop reason: SDUPTHER

## 2022-09-21 RX ORDER — FENTANYL CITRATE 50 UG/ML
50 INJECTION, SOLUTION INTRAMUSCULAR; INTRAVENOUS EVERY 5 MIN PRN
Status: DISCONTINUED | OUTPATIENT
Start: 2022-09-21 | End: 2022-09-21 | Stop reason: HOSPADM

## 2022-09-21 RX ORDER — LIDOCAINE HYDROCHLORIDE 20 MG/ML
INJECTION, SOLUTION EPIDURAL; INFILTRATION; INTRACAUDAL; PERINEURAL PRN
Status: DISCONTINUED | OUTPATIENT
Start: 2022-09-21 | End: 2022-09-21 | Stop reason: SDUPTHER

## 2022-09-21 RX ORDER — DIPHENHYDRAMINE HYDROCHLORIDE 50 MG/ML
12.5 INJECTION INTRAMUSCULAR; INTRAVENOUS
Status: DISCONTINUED | OUTPATIENT
Start: 2022-09-21 | End: 2022-09-21 | Stop reason: HOSPADM

## 2022-09-21 RX ORDER — DEXAMETHASONE SODIUM PHOSPHATE 10 MG/ML
INJECTION, EMULSION INTRAMUSCULAR; INTRAVENOUS PRN
Status: DISCONTINUED | OUTPATIENT
Start: 2022-09-21 | End: 2022-09-21 | Stop reason: SDUPTHER

## 2022-09-21 RX ORDER — METOCLOPRAMIDE HYDROCHLORIDE 5 MG/ML
10 INJECTION INTRAMUSCULAR; INTRAVENOUS
Status: DISCONTINUED | OUTPATIENT
Start: 2022-09-21 | End: 2022-09-21 | Stop reason: HOSPADM

## 2022-09-21 RX ORDER — SODIUM CHLORIDE 0.9 % (FLUSH) 0.9 %
5-40 SYRINGE (ML) INJECTION PRN
Status: DISCONTINUED | OUTPATIENT
Start: 2022-09-21 | End: 2022-09-21 | Stop reason: HOSPADM

## 2022-09-21 RX ORDER — ONDANSETRON 4 MG/1
4 TABLET, ORALLY DISINTEGRATING ORAL EVERY 8 HOURS PRN
Status: DISCONTINUED | OUTPATIENT
Start: 2022-09-21 | End: 2022-09-21 | Stop reason: HOSPADM

## 2022-09-21 RX ORDER — OXYCODONE HYDROCHLORIDE 5 MG/1
5 TABLET ORAL EVERY 4 HOURS PRN
Status: DISCONTINUED | OUTPATIENT
Start: 2022-09-21 | End: 2022-09-21 | Stop reason: HOSPADM

## 2022-09-21 RX ORDER — SODIUM CHLORIDE 0.9 % (FLUSH) 0.9 %
5-40 SYRINGE (ML) INJECTION EVERY 12 HOURS SCHEDULED
Status: DISCONTINUED | OUTPATIENT
Start: 2022-09-21 | End: 2022-09-21 | Stop reason: SDUPTHER

## 2022-09-21 RX ORDER — MIDAZOLAM HYDROCHLORIDE 1 MG/ML
INJECTION INTRAMUSCULAR; INTRAVENOUS PRN
Status: DISCONTINUED | OUTPATIENT
Start: 2022-09-21 | End: 2022-09-21 | Stop reason: SDUPTHER

## 2022-09-21 RX ORDER — FENTANYL CITRATE 50 UG/ML
INJECTION, SOLUTION INTRAMUSCULAR; INTRAVENOUS PRN
Status: DISCONTINUED | OUTPATIENT
Start: 2022-09-21 | End: 2022-09-21 | Stop reason: SDUPTHER

## 2022-09-21 RX ORDER — FENTANYL CITRATE 50 UG/ML
25 INJECTION, SOLUTION INTRAMUSCULAR; INTRAVENOUS EVERY 5 MIN PRN
Status: DISCONTINUED | OUTPATIENT
Start: 2022-09-21 | End: 2022-09-21 | Stop reason: HOSPADM

## 2022-09-21 RX ORDER — ONDANSETRON 2 MG/ML
INJECTION INTRAMUSCULAR; INTRAVENOUS PRN
Status: DISCONTINUED | OUTPATIENT
Start: 2022-09-21 | End: 2022-09-21 | Stop reason: SDUPTHER

## 2022-09-21 RX ORDER — BUPIVACAINE HYDROCHLORIDE 2.5 MG/ML
INJECTION, SOLUTION EPIDURAL; INFILTRATION; INTRACAUDAL PRN
Status: DISCONTINUED | OUTPATIENT
Start: 2022-09-21 | End: 2022-09-21 | Stop reason: ALTCHOICE

## 2022-09-21 RX ORDER — SODIUM CHLORIDE 9 MG/ML
INJECTION, SOLUTION INTRAVENOUS PRN
Status: DISCONTINUED | OUTPATIENT
Start: 2022-09-21 | End: 2022-09-21 | Stop reason: HOSPADM

## 2022-09-21 RX ORDER — SCOLOPAMINE TRANSDERMAL SYSTEM 1 MG/1
1 PATCH, EXTENDED RELEASE TRANSDERMAL ONCE
Status: DISCONTINUED | OUTPATIENT
Start: 2022-09-21 | End: 2022-09-21 | Stop reason: HOSPADM

## 2022-09-21 RX ADMIN — CEFAZOLIN 2000 MG: 1 INJECTION, POWDER, FOR SOLUTION INTRAMUSCULAR; INTRAVENOUS at 12:13

## 2022-09-21 RX ADMIN — FENTANYL CITRATE 50 MCG: 50 INJECTION, SOLUTION INTRAMUSCULAR; INTRAVENOUS at 13:03

## 2022-09-21 RX ADMIN — FENTANYL CITRATE 100 MCG: 50 INJECTION, SOLUTION INTRAMUSCULAR; INTRAVENOUS at 14:03

## 2022-09-21 RX ADMIN — FENTANYL CITRATE 50 MCG: 50 INJECTION, SOLUTION INTRAMUSCULAR; INTRAVENOUS at 12:32

## 2022-09-21 RX ADMIN — OXYCODONE 5 MG: 5 TABLET ORAL at 15:40

## 2022-09-21 RX ADMIN — ONDANSETRON 4 MG: 2 INJECTION INTRAMUSCULAR; INTRAVENOUS at 13:59

## 2022-09-21 RX ADMIN — MIDAZOLAM 2 MG: 1 INJECTION INTRAMUSCULAR; INTRAVENOUS at 12:07

## 2022-09-21 RX ADMIN — LIDOCAINE HYDROCHLORIDE 80 MG: 20 INJECTION, SOLUTION EPIDURAL; INFILTRATION; INTRACAUDAL; PERINEURAL at 12:10

## 2022-09-21 RX ADMIN — DEXAMETHASONE SODIUM PHOSPHATE 8 MG: 10 INJECTION, EMULSION INTRAMUSCULAR; INTRAVENOUS at 12:10

## 2022-09-21 RX ADMIN — FENTANYL CITRATE 25 MCG: 50 INJECTION, SOLUTION INTRAMUSCULAR; INTRAVENOUS at 12:15

## 2022-09-21 RX ADMIN — FENTANYL CITRATE 25 MCG: 50 INJECTION, SOLUTION INTRAMUSCULAR; INTRAVENOUS at 12:18

## 2022-09-21 RX ADMIN — PROPOFOL 150 MG: 10 INJECTION, EMULSION INTRAVENOUS at 12:10

## 2022-09-21 RX ADMIN — FENTANYL CITRATE 50 MCG: 50 INJECTION, SOLUTION INTRAMUSCULAR; INTRAVENOUS at 13:18

## 2022-09-21 ASSESSMENT — PAIN DESCRIPTION - LOCATION
LOCATION: FOOT
LOCATION: FOOT

## 2022-09-21 ASSESSMENT — PAIN DESCRIPTION - ORIENTATION
ORIENTATION: LEFT
ORIENTATION: LEFT

## 2022-09-21 ASSESSMENT — PAIN SCALES - GENERAL
PAINLEVEL_OUTOF10: 4
PAINLEVEL_OUTOF10: 5

## 2022-09-21 ASSESSMENT — PAIN - FUNCTIONAL ASSESSMENT: PAIN_FUNCTIONAL_ASSESSMENT: NONE - DENIES PAIN

## 2022-09-21 NOTE — OP NOTE
Operative Note      Patient: Denisa Booth  YOB: 1965  MRN: [id number]    Date of Procedure: 9/21/2022    Pre-Op Diagnosis: Hallux valgus (acquired), left foot [M20.12]  Acquired hammer toe of left foot [M20.42]  Plantar fibromatosis [M72.2]    Post-Op Diagnosis: Same       Procedure(s):  ARTHRODESIS 1ST METATARSOCUNEIFORM JOINT, DISTAL 1ST METATARSAL OSTEOTOMY, EXCISION OF PLANTAR FIBROMA WITH PLACEMENT OF AMNIOTIC MEMBRANE, PERCUTANEOUS FLEXOR TENOTOMY DIGITS 2,3,4,5 ALL ON THE LEFT FOOT    Surgeon(s):  Edilberto Dong DPM    Assistant:  Resident: Martine Contreras DPM    Anesthesia: General    Estimated Blood Loss (mL): Minimal    Complications: None    Specimens:   ID Type Source Tests Collected by Time Destination   A : PLANTAR FIBROMA LEFT FOOT Tissue Foot SURGICAL PATHOLOGY Bria Barth DPM 9/21/2022 1248        Implants:  Implant Name Type Inv.  Item Serial No.  Lot No. LRB No. Used Action   GRAFT HUM TISS [id number] CRYOPRESERVED PLCNTA TISS UMB AMNION - [id number]  GRAFT HUM TISS [id number] CRYOPRESERVED PLCNTA TISS UMB AMNION [id number] [facility] [id number] Left 1 Implanted   PARAGON 28 CUSTOM LAPIDUS WEDGE 8MM      Left 1 Implanted   STAPLE INT [id number] WIRE 1.5X1.5MM HND WRST NIT SPD CONT - [id number]  STAPLE INT [id number] WIRE 1.5X1.5MM HND WRST NIT SPD CONT  [employer] [id number] Left 1 Implanted   KIT STPL BRDG 18MM LEG 18MM MAX CLSR 10.4MM BME ELITE - [id number]  KIT STPL BRDG 18MM LEG 18MM MAX CLSR 10.4MM BME ELITE  [employer] [email or web address] [id number] Left 1 Implanted   KIT STPL BRDG 18MM LEG 18MM MAX CLSR 10.4MM BME ELITE - [id number]  KIT STPL BRDG 18MM LEG 18MM MAX CLSR 10.4MM BME ELITE  [employer] [facility] [id number] Left 1 Implanted         Drains: * No LDAs found *    Injectables: 30cc 1:1 mix of 2% lidocaine with epi and 0.25% marcaine plain    Suture: 3-0 vicryl, 2-0 prolene, 4-0 prolene    Findings: Consistent with diagnosis    Indications: Patient is a 61 y.o. female with a chief complaint of painful hallux abductovalgus, plantar fibroma left foot who is being seen by Dr. Yaneth Piña and being treated for these conditions. At this time all conservative options have been exhausted and surgical intervention is necessary. The procedure has been explained to the patient and they understand the risks, benefits and possible complications including painful scar, malunion, non-union, delayed union, recurrence of deformity, need for further surgery, infection, loss of digit, loss of foot, loss of limb. No promises have been made as to surgical outcome. Procedure: The patient was transported from the pre-op holding to the operating room and placed in a supine position. A pneumatic thigh tourniquet was applied to the left thigh. The left foot was then prepped and draped in the normal aspetic manner. The left foot was then exsanguinated with an esmark and the tourniquet was inflated to 300 mmHg. Attention was directed to the plantar aspect of the left foot. A curvilinear incision was made encompassing the medial band of the plantar fascia. The incision was sharply deepened to the level of fascia. A key elevator was used to resect the overlying subcutaneous tissue from the plantar fascia. The fascia was exposed. A #15 blade was used to danika the bounds of the fascia to be excised. With care, the #15 blade was used to resect the fascia. Care was taken to avoid the medial plantar nerve as the fascia was being excised. A stravix graft was obtained. 2 corners were tagged with 3-0 vicryl. These corners were then sutured into the distal-medial and distal-lateral aspect of the surgical field. The proximal-medial and proximal-lateral corners were tagged as well with 3-0 vicryl. Attention was then directed to the dorsomedial aspect of the left foot.   An incision was made to the medial side of the extensor hallucis longus tendon from the medial cuneiform to the hallux interphalangeal joint. This was bluntly carried down to the level of the deep fascia utilizing Metzenbaum scissors and forceps. Of note, all small bleeding vessels were ligated utilizing electrocautery. 15 blade was used to incise the periosteum and capsular tissues and reflected these tissues from the first TMT J.  A sagittal saw was used to take a wedge cut off of the distal aspect of the medial cuneiform. The sagittal saw was used to make a flat cut at the base of the first metatarsal parallel to the base. The joint surfaces were reduced and were seen to clinically reduce the intermetatarsal angle. However, there was shortening of the first ray. The decision was made to use a titanium wedge. Trial wedges were used and it was seen that the 8 mm wedge reduce the deformity sufficiently. The joint surfaces were fenestrated using a 2.0 mm fenestration bit. The Vulcan 28 titanium Lapidus wedge was then inserted. 2 Synthes 18 mm staples were used to fixate the joint, one dorsal laterally, and one medially. Sagittal saw was used on the bone underneath these staples and they were tamped down flush with the bone. It was seen that there was some residual abduction of the hallux. Along the distal aspect of the incision, the first interspace was explored and a lateral release was performed. A rongeur was used to resect a section of hypertrophic bone along the lateral first metatarsal just superior to the sesamoid. 15 blade was then used to incise into the medial first MPJ J exposing the first metatarsal head and the capsular tissues along the head were reflected. A sagittal saw was used to resect the medial eminence. It was seen that there was increased Posta. The sagittal saw was then used to resect a medially based wedge leaving the lateral cortex of the first metatarsal intact.   This was able to be reduced manually and a Synthes 11 mm staple was then used on the lateral aspect of the first metatarsal to maintain this reduction. It was seen that there is a flexible hammertoe deformity present to digits 2 through 5. A #61 Pauma blade was used to perform a percutaneous flexor tenotomy. It was seen that the deformity was clinically reduced following this procedure. The incision was flushed with copious amounts of sterile saline. The subcutaneous tissues were re-appoximated with 3-0 vicryl. The skin was closed using 2-0 prolene on plantar incision, 4-0 prolene on dorsal and plantar incisions. A post-op injection of 30 cc's of 1:1 mix of 2% lidocaine with epi and 0.25% marcaine plain was injected. The incision was dressed with adaptic, 4x4's, kerlix, etc.  The pneumatic thigh tourniquet was then deflated and an immediate hyperemic response was noted to all digits of the left foot. A below knee cast was then applied. The patient was transported to the PACU with VSS and NVS intact to all digits of the left foot. No complications were noted throughout the procedure. The patient is to be discharged per PACU protocol. The patient is to follow-up with Dr. Saurabh Funez in the office.     Dr. Saurabh Funez DPM    Electronically signed by Mellisa Woods DPM on 9/21/2022 at 2:55 PM

## 2022-09-21 NOTE — H&P
[address] [address]  History and Physical Update    Pt Name: Suyapa Ayala  MRN: [id number]  YOB: 1965  Date of evaluation: 9/21/2022    [x] I have examined the patient and reviewed the H&P/Consult and there are no changes to the patient or plans.     [] I have examined the patient and reviewed the H&P/Consult and have noted the following changes:        Leidy Matos DPM DPM  Electronically signed 9/21/2022 at 7:29 AM

## 2022-09-21 NOTE — DISCHARGE INSTRUCTIONS
redness, warmth, hardness at operative site. Blood soaked dressing (small amounts of oozing may be normal.)   Increased or progressive drainage from the surgical area   Inability to urinate or blood in the urine   Pain not relieved by the medications ordered   Persistent nausea and/or vomiting, unable to retain fluids. Swelling, increased redness, warmth, or hardness around operative area   Numb, tingling or cold toes   Toe(s) become white or bluish    FOLLOW-UP APPOINTMENT   []1 week   []2 weeks   [x]Other, As prevously scheduled     Call my office if you have any problem that concerns you [phone]. After hours, you can reach the answering service via the office phone number. IF YOU NEED IMMEDIATE ATTENTION, GO TO THE EMERGENCY ROOM AND YOUR DOCTOR WILL BE CONTACTED.       Dr. Georgeann Seip, DPM

## 2022-09-21 NOTE — PROGRESS NOTES
[address] 1  DROWSY RESPONSIVE TO VERBAL STIMULI. AIRWAY PATENT O2 SAT 98% RIGHT FOOT IN CAST ELEVATED ON PILLOW. TOES PINK AND WARM STATES HAS SOME DISCOMFORT RIGHT TOe # 5  1505 RESTING QUIETLY  1615 ASSESSMENT UNCHANGED.  1990 TRANSITIONED TO PHASE 2. DRINK AND SNACK GIVEN  1525  IN TO SEE.  1540 MEDICATED FOR PAIN # 4 SEE MAR  1610 ASSISTED TO BATHROOM PER WHEELCHAIR  1620 RETURNED TO ROOM ASSISTED TO GET DRESSED  1630 DISCHARGE INSTRUCTIONS GIVEN TO PATIENT AND  VERBALIZE UNDERSTANDING. [address] CAR WITHOUT COMPLAINT.

## 2022-09-21 NOTE — ANESTHESIA PRE PROCEDURE
Department of Anesthesiology  Preprocedure Note       Name:  Magy Brewer   Age:  61 y.o.  :  1965                                          MRN:  [id number]         Date:  2022      Surgeon: Cielo Riley):  Edilberto Devries DPM    Procedure: Procedure(s):  ARTHRODESIS 1ST METATARSOCUNEIFORM JOINT LEFT FOOT WITH STAPLE SCREW OR PLATE FIXATION POSS TITANIUM WEDGE, POSS HERRON BUNIONECTOMY, POSS AKIN OSTEOTOMY, EXCISION OF PLANTAR FIBROMA, CERCUTANEOUS FLEXOR TENOTOMY DIGITS 2,3,4,5 ALL ON THE LEFT FOOT    Medications prior to admission:   Prior to Admission medications    Medication Sig Start Date End Date Taking? Authorizing Provider   zolpidem (AMBIEN) 10 MG tablet Take 10 mg by mouth nightly as needed. 22  Historical Provider, MD   buPROPion (WELLBUTRIN XL) 150 MG extended release tablet daily 22   Historical Provider, MD   ALPRAZolam Jessica Balboa) 1 MG tablet Take 1 mg by mouth 3 times daily as needed for Sleep. Historical Provider, MD   FLUoxetine (PROZAC) 20 MG capsule Take 20 mg by mouth daily 21   Historical Provider, MD       Current medications:    Current Facility-Administered Medications   Medication Dose Route Frequency Provider Last Rate Last Admin    sodium chloride flush 0.9 % injection 5-40 mL  5-40 mL IntraVENous 2 times per day Doc Potash, DPM        sodium chloride flush 0.9 % injection 5-40 mL  5-40 mL IntraVENous PRN Doc Potash, DPM        0.9 % sodium chloride infusion   IntraVENous PRN Doc Potash, DPM        ceFAZolin (ANCEF) 2000 mg in dextrose 5 % 50 mL IVPB  2,000 mg IntraVENous On Call to [facility], DPM        scopolamine (TRANSDERM-SCOP) transdermal patch 1 patch  1 patch TransDERmal Once Moise Parson MD   1 patch at 22 1050       Allergies: Allergies   Allergen Reactions    Doxycycline Nausea And Vomiting       Problem List:  There is no problem list on file for this patient.       Past Medical History: Diagnosis Date    PFO (patent foramen ovale)     congential and closed    PONV (postoperative nausea and vomiting)        Past Surgical History:        Procedure Laterality Date    BUNIONECTOMY Right 01/26/2022    1ST METATARSOCUNEIFORM ARTHRODESIS WITH TITANIUM WEDGE STAPLES RIGHT FOOT DISTAL CHEVRON BUNIONECTOMY WITH SCREW FIXATION RIGHT FOOT AKIN OSTEOTOMY WITH STAPLE FIXATION RIGHT FOOT PERCUTANEOUS FLEXOR TENOTOMY DIGIT 2, 3, 4, 5 RIGHT FOOT EXCISION OF PLANTAR FIBROMA RIGHT FOOT performed by Leidy Matos DPM at [facility]      x2    COLONOSCOPY      EYE SURGERY      at age 1Years? ?   cross eyed    LEEP         Social History:    Social History     Tobacco Use    Smoking status: Never     Passive exposure: Past (family)    Smokeless tobacco: Never   Substance Use Topics    Alcohol use: Yes     Comment: weekends                                Counseling given: Not Answered      Vital Signs (Current):   Vitals:    09/15/22 1139 09/21/22 1027   BP:  (!) 145/79   Pulse:  62   Resp:  16   Temp:  97.1 °F (36.2 °C)   TempSrc:  Temporal   SpO2:  100%   Weight: 102 lb (46.3 kg) 101 lb 9.6 oz (46.1 kg)   Height: 4' 10\" (1.473 m) 4' 10\" (1.473 m)                                              BP Readings from Last 3 Encounters:   09/21/22 (!) 145/79   01/26/22 108/60   01/26/22 (!) 112/59       NPO Status: Time of last liquid consumption: 1900                        Time of last solid consumption: 1900                        Date of last liquid consumption: 09/20/22                        Date of last solid food consumption: 09/20/22    BMI:   Wt Readings from Last 3 Encounters:   09/21/22 101 lb 9.6 oz (46.1 kg)   01/26/22 125 lb (56.7 kg)     Body mass index is 21.23 kg/m².     CBC:   Lab Results   Component Value Date/Time    WBC 9.7 08/22/2022 12:21 PM    RBC 4.45 08/22/2022 12:21 PM    HGB 13.9 08/22/2022 12:21 PM    HCT 42.2 08/22/2022 12:21 PM MCV 94.8 08/22/2022 12:21 PM     08/22/2022 12:21 PM       CMP:   Lab Results   Component Value Date/Time     08/22/2022 12:21 PM    K 3.9 08/22/2022 12:21 PM     08/22/2022 12:21 PM    CO2 27 08/22/2022 12:21 PM    BUN 15 08/22/2022 12:21 PM    CREATININE 0.7 08/22/2022 12:21 PM    LABGLOM 86 08/22/2022 12:21 PM    GLUCOSE 98 08/22/2022 12:21 PM    PROT 7.1 08/22/2022 12:21 PM    CALCIUM 10.1 08/22/2022 12:21 PM    BILITOT 0.5 08/22/2022 12:21 PM    ALKPHOS 62 08/22/2022 12:21 PM    AST 17 08/22/2022 12:21 PM    ALT 12 08/22/2022 12:21 PM       POC Tests: No results for input(s): POCGLU, POCNA, POCK, POCCL, POCBUN, POCHEMO, POCHCT in the last 72 hours. Coags: No results found for: PROTIME, INR, APTT    HCG (If Applicable): No results found for: PREGTESTUR, PREGSERUM, HCG, HCGQUANT     ABGs: No results found for: PHART, PO2ART, SJR7ZIQ, BCS5LSN, BEART, H9ZZUNSE     Type & Screen (If Applicable):  No results found for: LABABO, LABRH    Drug/Infectious Status (If Applicable):  No results found for: HIV, HEPCAB    COVID-19 Screening (If Applicable): No results found for: COVID19        Anesthesia Evaluation  Patient summary reviewed   history of anesthetic complications: PONV. Airway: Mallampati: II  TM distance: >3 FB   Neck ROM: full  Mouth opening: > = 3 FB   Dental: normal exam         Pulmonary:normal exam                               Cardiovascular:                      Neuro/Psych:               GI/Hepatic/Renal:        (-) GERD       Endo/Other:                     Abdominal:             Vascular: Other Findings:           Anesthesia Plan      general     ASA 2       Induction: intravenous. MIPS: Postoperative opioids intended and Prophylactic antiemetics administered. Anesthetic plan and risks discussed with patient.       Plan discussed with surgical team.                    Kathe Kaiser MD   9/21/2022

## 2022-09-21 NOTE — BRIEF OP NOTE
Brief Postoperative Note      Patient: Crystal Gonzalez  YOB: 1965  MRN: [id number]    Date of Procedure: 9/21/2022    Pre-Op Diagnosis: Hallux valgus (acquired), left foot [M20.12]  Acquired hammer toe of left foot [M20.42]  Plantar fibromatosis [M72.2]    Post-Op Diagnosis: Same       Procedure(s):  ARTHRODESIS 1ST METATARSOCUNEIFORM JOINT, DISTAL 1ST METATARSAL OSTEOTOMY, EXCISION OF PLANTAR FIBROMA WITH PLACEMENT OF AMNIOTIC MEMBRANE, PERCUTANEOUS FLEXOR TENOTOMY DIGITS 2,3,4,5 ALL ON THE LEFT FOOT    Surgeon(s):  Edilberto Kessler DPM    Assistant:  Resident: Sunitha Aleman DPM    Anesthesia: General    Estimated Blood Loss (mL): Minimal    Complications: None    Specimens:   ID Type Source Tests Collected by Time Destination   A : PLANTAR FIBROMA LEFT FOOT Tissue Foot SURGICAL PATHOLOGY Jared Parada DPM 9/21/2022 1248        Implants:  Implant Name Type Inv.  Item Serial No.  Lot No. LRB No. Used Action   GRAFT HUM TISS [id number] CRYOPRESERVED PLCNTA TISS UMB AMNION - [id number]  GRAFT HUM TISS [id number] CRYOPRESERVED PLCNTA TISS UMB AMNION [id number] [facility] [id number] Left 1 Implanted   PARAGON 28 CUSTOM LAPIDUS WEDGE 8MM      Left 1 Implanted   STAPLE INT [id number] WIRE 1.5X1.5MM HND WRST NIT SPD CONT - [id number]  STAPLE INT [id number] WIRE 1.5X1.5MM HND WRST NIT SPD CONT  [employer] USA-WD [id number] Left 1 Implanted   KIT STPL BRDG 18MM LEG 18MM MAX CLSR 10.4MM BME ELITE - [id number]  KIT STPL BRDG 18MM LEG 18MM MAX CLSR 10.4MM BME ELITE  [facility] [employer] [facility] [id number] Left 1 Implanted   KIT STPL BRDG 18MM LEG 18MM MAX CLSR 10.4MM BME ELITE - [id number]  KIT STPL BRDG 18MM LEG 18MM MAX CLSR 10.4MM BME ELITE  [facility] [employer] [facility] [id number] Left 1 Implanted         Drains: * No LDAs found *    Injectables: 30cc 1:1 mix of 2% lidocaine with epi and 0.25% marcaine plain    Suture: 3-0 vicryl, 2-0 prolene, 4-0 prolene    Findings: Consistent with diagnosis    Electronically signed by Rula Parr DPM on 9/21/2022 at 2:54 PM

## 2022-09-22 NOTE — ANESTHESIA POSTPROCEDURE EVALUATION
Department of Anesthesiology  Postprocedure Note    Patient: Yohannes Perdue  MRN: [id number]  YOB: 1965  Date of evaluation: 9/22/2022      Procedure Summary     Date: 09/21/22 Room / Location: [facility] / [facility]    Anesthesia Start: 1207 Anesthesia Stop: 1747    Procedure: ARTHRODESIS 1ST METATARSOCUNEIFORM JOINT, DISTAL 1ST METATARSAL OSTEOTOMY, EXCISION OF PLANTAR FIBROMA WITH PLACEMENT OF AMNIOTIC MEMBRANE, PERCUTANEOUS FLEXOR TENOTOMY DIGITS 2,3,4,5 ALL ON THE LEFT FOOT (Left: Foot) Diagnosis:       Hallux valgus (acquired), left foot      Acquired hammer toe of left foot      Plantar fibromatosis      (Hallux valgus (acquired), left foot [M20.12])      (Acquired hammer toe of left foot [M20.42])      (Plantar fibromatosis [M72.2])    Surgeons: Leonardo Perdomo DPM Responsible Provider: Alexandra Wallace MD    Anesthesia Type: general ASA Status: 2          Anesthesia Type: No value filed. Monty Phase I:      Monty Phase II: Monty Score: 10      Anesthesia Post Evaluation    Patient location during evaluation: PACU  Patient participation: complete - patient participated  Level of consciousness: awake and alert  Airway patency: patent  Nausea & Vomiting: no nausea and no vomiting  Complications: no  Cardiovascular status: hemodynamically stable  Respiratory status: acceptable  Hydration status: euvolemic    [address]  POST-ANESTHESIA NOTE       Name:  Yohannes Perdue                                         Age:  61 y.o. MRN:  [id number]      Last Vitals:  /60   Pulse 77   Temp 97.8 °F (36.6 °C) (Temporal)   Resp 20   Ht 4' 10\" (1.473 m)   Wt 101 lb 9.6 oz (46.1 kg)   SpO2 95%   BMI 21.23 kg/m²   No data found.     Level of Consciousness:  Awake    Respiratory:  Stable    Oxygen Saturation:  Stable    Cardiovascular:  Stable    Hydration:  Adequate    PONV:  Stable    Post-op Pain:  Adequate analgesia    Post-op Assessment: No apparent anesthetic complications    Additional Follow-Up / Treatment / Comment:  None    David Burt MD  September 22, 2022   11:08 AM

## 2022-11-17 ENCOUNTER — NURSE ONLY (OUTPATIENT)
Dept: LAB | Age: 57
End: 2022-11-17

## 2022-11-17 LAB
ALBUMIN SERPL-MCNC: 4.5 G/DL (ref 3.5–5.1)
ALP BLD-CCNC: 95 U/L (ref 38–126)
ALT SERPL-CCNC: 8 U/L (ref 11–66)
ANION GAP SERPL CALCULATED.3IONS-SCNC: 13 MEQ/L (ref 8–16)
AST SERPL-CCNC: 14 U/L (ref 5–40)
BASOPHILS # BLD: 0.5 %
BASOPHILS ABSOLUTE: 0 THOU/MM3 (ref 0–0.1)
BILIRUB SERPL-MCNC: 0.2 MG/DL (ref 0.3–1.2)
BUN BLDV-MCNC: 14 MG/DL (ref 7–22)
CALCIUM SERPL-MCNC: 9.1 MG/DL (ref 8.5–10.5)
CHLORIDE BLD-SCNC: 103 MEQ/L (ref 98–111)
CO2: 27 MEQ/L (ref 23–33)
CREAT SERPL-MCNC: 0.7 MG/DL (ref 0.4–1.2)
EOSINOPHIL # BLD: 1.8 %
EOSINOPHILS ABSOLUTE: 0.2 THOU/MM3 (ref 0–0.4)
ERYTHROCYTE [DISTWIDTH] IN BLOOD BY AUTOMATED COUNT: 12.4 % (ref 11.5–14.5)
ERYTHROCYTE [DISTWIDTH] IN BLOOD BY AUTOMATED COUNT: 42.5 FL (ref 35–45)
GFR SERPL CREATININE-BSD FRML MDRD: > 60 ML/MIN/1.73M2
GLUCOSE BLD-MCNC: 98 MG/DL (ref 70–108)
HCT VFR BLD CALC: 39.7 % (ref 37–47)
HEMOGLOBIN: 13.3 GM/DL (ref 12–16)
IMMATURE GRANS (ABS): 0.03 THOU/MM3 (ref 0–0.07)
IMMATURE GRANULOCYTES: 0.3 %
LYMPHOCYTES # BLD: 31.8 %
LYMPHOCYTES ABSOLUTE: 3 THOU/MM3 (ref 1–4.8)
MCH RBC QN AUTO: 31.3 PG (ref 26–33)
MCHC RBC AUTO-ENTMCNC: 33.5 GM/DL (ref 32.2–35.5)
MCV RBC AUTO: 93.4 FL (ref 81–99)
MONOCYTES # BLD: 5.2 %
MONOCYTES ABSOLUTE: 0.5 THOU/MM3 (ref 0.4–1.3)
NUCLEATED RED BLOOD CELLS: 0 /100 WBC
PLATELET # BLD: 231 THOU/MM3 (ref 130–400)
PMV BLD AUTO: 10.2 FL (ref 9.4–12.4)
POTASSIUM SERPL-SCNC: 3.4 MEQ/L (ref 3.5–5.2)
RBC # BLD: 4.25 MILL/MM3 (ref 4.2–5.4)
SEG NEUTROPHILS: 60.4 %
SEGMENTED NEUTROPHILS ABSOLUTE COUNT: 5.6 THOU/MM3 (ref 1.8–7.7)
SODIUM BLD-SCNC: 143 MEQ/L (ref 135–145)
TOTAL PROTEIN: 6.7 G/DL (ref 6.1–8)
WBC # BLD: 9.3 THOU/MM3 (ref 4.8–10.8)

## 2022-11-18 NOTE — PROGRESS NOTES
In preparation for their surgical procedure above patient was screened for Obstructive Sleep Apnea (CONCEPCION) using the STOP-Bang Questionnaire by the Pre-Admission Testing department. This is a pre-surgical screening tool for patient safety and serves as a recommendation, this WILL NOT cause cancellation of surgery. STOP-Bang Questionnaire  * Do you currently see a pulmonologist?  No     If yes STOP, do not complete. Patient follows with Dr.     1.  Do you snore loudly (able to be heard in the next room)? No    2. Do you often feel tired or sleepy during the daytime? No       3. Has anyone ever told you that you stop breathing during your sleep? No    4. Do you have or are you being treated for high blood pressure? No      5. BMI more than 35? BMI (Calculated): 22.6        No    6. Age over 48 years? 61 y.o. Yes    7. Neck Circumference greater than 17 inches for male or 16 inches for female? Measured           (visits only)            Not Applicable    8. Gender Male? No      TOTAL SCORE: 1    CONCEPCION - Low Risk : Yes to 0 - 2 questions  CONCEPCION - Intermediate Risk : Yes to 3 - 4 questions  CONCEPCION - High Risk : Yes to 5 - 8 questions    Adapted from:   STOP Questionnaire: A Tool to Screen Patients for Obstructive Sleep Apnea   [facility] Vic Zarate M.B.B.S., Yen Bruno M.D., Jose Mckeon. Leobardo Dick, Ph.D., Dana Corona M.B.B.S., Mirta Albarran, M.Sc., Rosaline Mota M.D., [facility] Jara. Rex Dancer, F.R.C.P.C.    Anesthesiology 2008; 791:789-26 Copyright 2008, the [address] of Anesthesiologists, [facility] 37.   ----------------------------------------------------------------------------------------------------------------

## 2022-11-18 NOTE — PROGRESS NOTES
NPO after midnight  [employer] and drivers license  Wear comfortable clean clothing  Do not bring jewelry  Shower night before and morning of surgery with a liquid antibacterial soap  Bring list of medications with dosage and how often taken  Follow all instructions given by your physician   needed at discharge  Please limit to 2 visitors for surgery  You must have a responsible adult with you day of surgery and for 24 hours after surgery  Call [phone] for any questions

## 2022-11-30 ENCOUNTER — ANESTHESIA (OUTPATIENT)
Dept: OPERATING ROOM | Age: 57
End: 2022-11-30
Payer: COMMERCIAL

## 2022-11-30 ENCOUNTER — ANESTHESIA EVENT (OUTPATIENT)
Dept: OPERATING ROOM | Age: 57
End: 2022-11-30
Payer: COMMERCIAL

## 2022-11-30 ENCOUNTER — HOSPITAL ENCOUNTER (OUTPATIENT)
Age: 57
Setting detail: OUTPATIENT SURGERY
Discharge: HOME | End: 2022-11-30
Attending: PODIATRIST | Admitting: PODIATRIST
Payer: COMMERCIAL

## 2022-11-30 VITALS
RESPIRATION RATE: 16 BRPM | SYSTOLIC BLOOD PRESSURE: 126 MMHG | TEMPERATURE: 97.1 F | DIASTOLIC BLOOD PRESSURE: 62 MMHG | WEIGHT: 111.2 LBS | OXYGEN SATURATION: 97 % | BODY MASS INDEX: 23.34 KG/M2 | HEART RATE: 76 BPM | HEIGHT: 58 IN

## 2022-11-30 PROCEDURE — 2580000003 HC RX 258: Performed by: NURSE ANESTHETIST, CERTIFIED REGISTERED

## 2022-11-30 PROCEDURE — 7100000010 HC PHASE II RECOVERY - FIRST 15 MIN: Performed by: PODIATRIST

## 2022-11-30 PROCEDURE — 3700000001 HC ADD 15 MINUTES (ANESTHESIA): Performed by: PODIATRIST

## 2022-11-30 PROCEDURE — 7100000011 HC PHASE II RECOVERY - ADDTL 15 MIN: Performed by: PODIATRIST

## 2022-11-30 PROCEDURE — 3600000002 HC SURGERY LEVEL 2 BASE: Performed by: PODIATRIST

## 2022-11-30 PROCEDURE — 2709999900 HC NON-CHARGEABLE SUPPLY: Performed by: PODIATRIST

## 2022-11-30 PROCEDURE — 3600000012 HC SURGERY LEVEL 2 ADDTL 15MIN: Performed by: PODIATRIST

## 2022-11-30 PROCEDURE — 2500000003 HC RX 250 WO HCPCS: Performed by: PODIATRIST

## 2022-11-30 PROCEDURE — 2500000003 HC RX 250 WO HCPCS: Performed by: NURSE ANESTHETIST, CERTIFIED REGISTERED

## 2022-11-30 PROCEDURE — 6360000002 HC RX W HCPCS: Performed by: NURSE ANESTHETIST, CERTIFIED REGISTERED

## 2022-11-30 PROCEDURE — 3700000000 HC ANESTHESIA ATTENDED CARE: Performed by: PODIATRIST

## 2022-11-30 RX ORDER — PROPOFOL 10 MG/ML
INJECTION, EMULSION INTRAVENOUS PRN
Status: DISCONTINUED | OUTPATIENT
Start: 2022-11-30 | End: 2022-11-30 | Stop reason: SDUPTHER

## 2022-11-30 RX ORDER — LIDOCAINE HYDROCHLORIDE 20 MG/ML
INJECTION, SOLUTION EPIDURAL; INFILTRATION; INTRACAUDAL; PERINEURAL PRN
Status: DISCONTINUED | OUTPATIENT
Start: 2022-11-30 | End: 2022-11-30 | Stop reason: SDUPTHER

## 2022-11-30 RX ORDER — BUPIVACAINE HYDROCHLORIDE 2.5 MG/ML
INJECTION, SOLUTION EPIDURAL; INFILTRATION; INTRACAUDAL PRN
Status: DISCONTINUED | OUTPATIENT
Start: 2022-11-30 | End: 2022-11-30 | Stop reason: ALTCHOICE

## 2022-11-30 RX ORDER — ZOLPIDEM TARTRATE 5 MG/1
5 TABLET ORAL NIGHTLY PRN
COMMUNITY

## 2022-11-30 RX ORDER — SODIUM CHLORIDE 9 MG/ML
INJECTION, SOLUTION INTRAVENOUS PRN
Status: DISCONTINUED | OUTPATIENT
Start: 2022-11-30 | End: 2022-11-30 | Stop reason: HOSPADM

## 2022-11-30 RX ORDER — ONDANSETRON 4 MG/1
4 TABLET, ORALLY DISINTEGRATING ORAL EVERY 8 HOURS PRN
Status: DISCONTINUED | OUTPATIENT
Start: 2022-11-30 | End: 2022-11-30 | Stop reason: HOSPADM

## 2022-11-30 RX ORDER — SODIUM CHLORIDE 0.9 % (FLUSH) 0.9 %
5-40 SYRINGE (ML) INJECTION PRN
Status: DISCONTINUED | OUTPATIENT
Start: 2022-11-30 | End: 2022-11-30 | Stop reason: HOSPADM

## 2022-11-30 RX ORDER — LIDOCAINE HYDROCHLORIDE AND EPINEPHRINE 20; 5 MG/ML; UG/ML
INJECTION, SOLUTION EPIDURAL; INFILTRATION; INTRACAUDAL; PERINEURAL PRN
Status: DISCONTINUED | OUTPATIENT
Start: 2022-11-30 | End: 2022-11-30 | Stop reason: ALTCHOICE

## 2022-11-30 RX ORDER — ONDANSETRON 2 MG/ML
4 INJECTION INTRAMUSCULAR; INTRAVENOUS EVERY 6 HOURS PRN
Status: DISCONTINUED | OUTPATIENT
Start: 2022-11-30 | End: 2022-11-30 | Stop reason: HOSPADM

## 2022-11-30 RX ORDER — SODIUM CHLORIDE 0.9 % (FLUSH) 0.9 %
5-40 SYRINGE (ML) INJECTION EVERY 12 HOURS SCHEDULED
Status: DISCONTINUED | OUTPATIENT
Start: 2022-11-30 | End: 2022-11-30 | Stop reason: HOSPADM

## 2022-11-30 RX ORDER — FENTANYL CITRATE 50 UG/ML
INJECTION, SOLUTION INTRAMUSCULAR; INTRAVENOUS PRN
Status: DISCONTINUED | OUTPATIENT
Start: 2022-11-30 | End: 2022-11-30 | Stop reason: SDUPTHER

## 2022-11-30 RX ORDER — CEFAZOLIN SODIUM 1 G/3ML
INJECTION, POWDER, FOR SOLUTION INTRAMUSCULAR; INTRAVENOUS PRN
Status: DISCONTINUED | OUTPATIENT
Start: 2022-11-30 | End: 2022-11-30 | Stop reason: SDUPTHER

## 2022-11-30 RX ORDER — SODIUM CHLORIDE 9 MG/ML
INJECTION, SOLUTION INTRAVENOUS CONTINUOUS PRN
Status: DISCONTINUED | OUTPATIENT
Start: 2022-11-30 | End: 2022-11-30 | Stop reason: SDUPTHER

## 2022-11-30 RX ORDER — SODIUM CHLORIDE 9 MG/ML
INJECTION, SOLUTION INTRAVENOUS CONTINUOUS
Status: DISCONTINUED | OUTPATIENT
Start: 2022-11-30 | End: 2022-11-30 | Stop reason: HOSPADM

## 2022-11-30 RX ADMIN — FENTANYL CITRATE 50 MCG: 50 INJECTION, SOLUTION INTRAMUSCULAR; INTRAVENOUS at 14:11

## 2022-11-30 RX ADMIN — LIDOCAINE HYDROCHLORIDE 100 MG: 20 INJECTION, SOLUTION EPIDURAL; INFILTRATION; INTRACAUDAL; PERINEURAL at 14:15

## 2022-11-30 RX ADMIN — PROPOFOL 30 MG: 10 INJECTION, EMULSION INTRAVENOUS at 14:15

## 2022-11-30 RX ADMIN — PROPOFOL 40 MG: 10 INJECTION, EMULSION INTRAVENOUS at 14:18

## 2022-11-30 RX ADMIN — CEFAZOLIN 2 G: 1 INJECTION, POWDER, FOR SOLUTION INTRAMUSCULAR; INTRAVENOUS at 14:12

## 2022-11-30 RX ADMIN — SODIUM CHLORIDE: 9 INJECTION, SOLUTION INTRAVENOUS at 14:10

## 2022-11-30 RX ADMIN — FENTANYL CITRATE 50 MCG: 50 INJECTION, SOLUTION INTRAMUSCULAR; INTRAVENOUS at 14:13

## 2022-11-30 RX ADMIN — PROPOFOL 30 MG: 10 INJECTION, EMULSION INTRAVENOUS at 14:16

## 2022-11-30 ASSESSMENT — PAIN - FUNCTIONAL ASSESSMENT: PAIN_FUNCTIONAL_ASSESSMENT: 0-10

## 2022-11-30 NOTE — PROGRESS NOTES
1436: patient arrived to room via chair, denies needs, snack and drink provided, call light within reach, walking shoe in place,  at bedside. Denies needs at this time. 1455: patient denies needs. IV removed, patient denies any pain. 1505: Discharge instructions reviewed with patient and family member. All questions were addressed and answered. Patient and family member verbalized understanding of discharge plan. 1515: patient wheeled to discharge lobby in stable condition. Patient discharged home with .

## 2022-11-30 NOTE — DISCHARGE INSTRUCTIONS
redness, warmth, hardness at operative site. Blood soaked dressing (small amounts of oozing may be normal.)   Increased or progressive drainage from the surgical area   Inability to urinate or blood in the urine   Pain not relieved by the medications ordered   Persistent nausea and/or vomiting, unable to retain fluids. Swelling, increased redness, warmth, or hardness around operative area   Numb, tingling or cold toes   Toe(s) become white or bluish    FOLLOW-UP APPOINTMENT   []1 week   []2 weeks   [x]Other, As prevously scheduled     Call my office if you have any problem that concerns you [phone]. After hours, you can reach the answering service via the office phone number. IF YOU NEED IMMEDIATE ATTENTION, GO TO THE EMERGENCY ROOM AND YOUR DOCTOR WILL BE CONTACTED.       JUDY Rust DPM,  Podiatric Surgical Resident  11/30/2022  2:34 PM

## 2022-11-30 NOTE — H&P
[address] [address]  History and Physical Update    Pt Name: Natanael Douglass  MRN: [id number]  YOB: 1965  Date of evaluation: 11/30/2022    [x] I have examined the patient and reviewed the H&P/Consult and there are no changes to the patient or plans.     [] I have examined the patient and reviewed the H&P/Consult and have noted the following changes:        Yudith Edmondson DPM DPM  Electronically signed 11/30/2022 at 7:25 AM

## 2022-11-30 NOTE — BRIEF OP NOTE
Brief Postoperative Note      Patient: Jacques Contreras  YOB: 1965  MRN: [id number]    Date of Procedure: 11/30/2022    Pre-Op Diagnosis: Hammer toe of left foot [M20.42]  Contracture, left foot [M24.575]    Post-Op Diagnosis: Same       Procedure(s):  PERCUTANEOUS EXTENSOR TENOTOMY DIGITS 2,3,4, 5TH DIGIT LEFT FOOT    Surgeon(s):  Edilberto Childress DPM    Assistant:  Resident: Jane Cazares DPM    Anesthesia: Monitor Anesthesia Care    Estimated Blood Loss (mL): Minimal    Complications: None    Specimens:   * No specimens in log *    Implants:  * No implants in log *      Drains: * No LDAs found *    Findings: consistent with diagnosis    Electronically signed by Jane Cazares DPM on 11/30/2022 at 2:36 PM

## 2022-11-30 NOTE — ANESTHESIA POSTPROCEDURE EVALUATION
Department of Anesthesiology  Postprocedure Note    Patient: Jl Houston  MRN: [id number]  YOB: 1965  Date of evaluation: 11/30/2022      Procedure Summary     Date: 11/30/22 Room / Location: [address] [place] [address] 01 / 138 taiwo Reddy    Anesthesia Start: 5736 Anesthesia Stop: 0223    Procedure: PERCUTANEOUS EXTENSOR TENOTOMY DIGITS 2,3,4, 5TH DIGIT LEFT FOOT (Left) Diagnosis:       Hammer toe of left foot      Contracture, left foot      (Hammer toe of left foot [M20.42])      (Contracture, left foot [M24.575])    Surgeons: Chitra Fall DPM Responsible Provider: Nakul Amaro MD    Anesthesia Type: MAC ASA Status: 2          Anesthesia Type: No value filed.     Monty Phase I:      Monty Phase II:        Anesthesia Post Evaluation    Patient location during evaluation: bedside  Patient participation: complete - patient participated  Level of consciousness: awake and alert  Airway patency: patent  Nausea & Vomiting: no nausea and no vomiting  Complications: no  Cardiovascular status: hemodynamically stable  Respiratory status: acceptable, room air and spontaneous ventilation  Hydration status: euvolemic

## 2022-11-30 NOTE — ANESTHESIA PRE PROCEDURE
Department of Anesthesiology  Preprocedure Note       Name:  Zoraida Dimas   Age:  61 y.o.  :  1965                                          MRN:  [id number]         Date:  2022      Surgeon: Juan Pablo Briseno):  Edilberto Alberto DPM    Procedure: Procedure(s):  PERCUTANEOUS EXTENSOR TENOTOMY DIGITS 2,3,4 AND POSSIBLE 5TH DIGIT LEFT FOOT    Medications prior to admission:   Prior to Admission medications    Medication Sig Start Date End Date Taking? Authorizing Provider   zolpidem (AMBIEN) 5 MG tablet Take 5 mg by mouth nightly as needed for Sleep. Historical Provider, MD   buPROPion (WELLBUTRIN XL) 150 MG extended release tablet daily 22   Historical Provider, MD   ALPRAZolam Omar Monica) 1 MG tablet Take 1 mg by mouth 3 times daily as needed for Sleep. Historical Provider, MD   FLUoxetine (PROZAC) 20 MG capsule Take 20 mg by mouth daily 21   Historical Provider, MD       Current medications:    No current outpatient medications on file. No current facility-administered medications for this visit. Allergies: Allergies   Allergen Reactions    Doxycycline Nausea And Vomiting       Problem List:  There is no problem list on file for this patient.       Past Medical History:        Diagnosis Date    PFO (patent foramen ovale)     congential and closed    PONV (postoperative nausea and vomiting)        Past Surgical History:        Procedure Laterality Date    ARTHRODESIS Left 2022    ARTHRODESIS 1ST METATARSOCUNEIFORM JOINT, DISTAL 1ST METATARSAL OSTEOTOMY, EXCISION OF PLANTAR FIBROMA WITH PLACEMENT OF AMNIOTIC MEMBRANE, PERCUTANEOUS FLEXOR TENOTOMY DIGITS 2,3,4,5 ALL ON THE LEFT FOOT performed by Karime Yen DPM at [address] [place] [address] Right 2022    1ST METATARSOCUNEIFORM ARTHRODESIS WITH TITANIUM WEDGE STAPLES RIGHT FOOT DISTAL CHEVRON BUNIONECTOMY WITH SCREW FIXATION RIGHT FOOT AKIN OSTEOTOMY WITH STAPLE FIXATION RIGHT FOOT PERCUTANEOUS FLEXOR TENOTOMY DIGIT 2, 3, 4, 5 RIGHT FOOT EXCISION OF PLANTAR FIBROMA RIGHT FOOT performed by Thompson Diaz DPM at [facility]      x2    COLONOSCOPY      EYE SURGERY      at age 1Years? ?   cross eyed    LORENA         Social History:    Social History     Tobacco Use    Smoking status: Never     Passive exposure: Past (family)    Smokeless tobacco: Never   Substance Use Topics    Alcohol use: Yes     Comment: weekends                                Counseling given: Not Answered      Vital Signs (Current): There were no vitals filed for this visit. BP Readings from Last 3 Encounters:   11/30/22 (!) 149/70   09/21/22 125/60   01/26/22 108/60       NPO Status:                                                                                 BMI:   Wt Readings from Last 3 Encounters:   11/30/22 111 lb 3.2 oz (50.4 kg)   09/21/22 101 lb 9.6 oz (46.1 kg)   01/26/22 125 lb (56.7 kg)     There is no height or weight on file to calculate BMI.    CBC:   Lab Results   Component Value Date/Time    WBC 9.3 11/17/2022 01:27 PM    RBC 4.25 11/17/2022 01:27 PM    HGB 13.3 11/17/2022 01:27 PM    HCT 39.7 11/17/2022 01:27 PM    MCV 93.4 11/17/2022 01:27 PM     11/17/2022 01:27 PM       CMP:   Lab Results   Component Value Date/Time     11/17/2022 01:27 PM    K 3.4 11/17/2022 01:27 PM     11/17/2022 01:27 PM    CO2 27 11/17/2022 01:27 PM    BUN 14 11/17/2022 01:27 PM    CREATININE 0.7 11/17/2022 01:27 PM    LABGLOM >60 11/17/2022 01:27 PM    GLUCOSE 98 11/17/2022 01:27 PM    PROT 6.7 11/17/2022 01:27 PM    CALCIUM 9.1 11/17/2022 01:27 PM    BILITOT 0.2 11/17/2022 01:27 PM    ALKPHOS 95 11/17/2022 01:27 PM    AST 14 11/17/2022 01:27 PM    ALT 8 11/17/2022 01:27 PM       POC Tests: No results for input(s): POCGLU, POCNA, POCK, POCCL, POCBUN, POCHEMO, POCHCT in the last 72 hours.     Coags: No results found for: PROTIME, INR, APTT    HCG (If Applicable): No results found for: PREGTESTUR, PREGSERUM, HCG, HCGQUANT     ABGs: No results found for: PHART, PO2ART, BWZ7VVS, MTM6BVJ, BEART, I5XZJXBH     Type & Screen (If Applicable):  No results found for: LABABO, LABRH    Drug/Infectious Status (If Applicable):  No results found for: HIV, HEPCAB    COVID-19 Screening (If Applicable): No results found for: COVID19        Anesthesia Evaluation  Patient summary reviewed   history of anesthetic complications: PONV. Airway: Mallampati: II  TM distance: >3 FB   Neck ROM: full  Mouth opening: > = 3 FB   Dental: normal exam         Pulmonary:normal exam                               Cardiovascular:                      Neuro/Psych:               GI/Hepatic/Renal:        (-) GERD       Endo/Other:                     Abdominal:             Vascular: Other Findings:             Anesthesia Plan      MAC     ASA 2       Induction: intravenous. MIPS: Prophylactic antiemetics administered. Anesthetic plan and risks discussed with patient and spouse. Plan discussed with surgical team and CRNA.                     Sheyla Harvey MD   11/30/2022

## 2022-11-30 NOTE — OP NOTE
Operative Note      Patient: Roque Childress  YOB: 1965  MRN: [id number]    Date of Procedure: 11/30/2022    Pre-Op Diagnosis: Hammer toe of left foot [M20.42]  Contracture, left foot [M24.575]    Post-Op Diagnosis: Same       Procedure(s):  PERCUTANEOUS EXTENSOR TENOTOMY DIGITS 2,3,4, 5TH DIGIT LEFT FOOT    Surgeon(s):  Edilberto Leon DPM    Assistant:  Resident: Bigg Price DPM    Anesthesia: Monitor Anesthesia Care    Estimated Blood Loss (mL): Minimal    Complications: None    Specimens:   * No specimens in log *    Implants:  * No implants in log *      Drains: * No LDAs found *    Findings: consistent with diagnosis    Detailed Description of Procedure: The patient was transported from the pre-op holding to the operating room and placed in a supine position. A pre-operative injection of 20cc of 1:1 0.25% marcaine plain and 2% lidocaine with epinephrine mix was injected into the left foot in a forefoot block. The left foot was then prepped and draped in the normal aspetic manner. Attention was directed to the extensor tendons of digits 2-5 on the dorsum of the forefoot at the level of the metatarsals midshaft. The extensor tendons were palpated while the patient extended her toes. The extensor tendons were then transected percutaneously using a #62 blade. Complete transection of the tendons was confirmed through lack of dorsiflexion while the patient actively attempted to extend her toes. The incision was dressed with adaptic and 4x4's. A post-op shoe was then applied. The patient was transported to the PACU with VSS and NVS intact to all digits of the left foot. No complications were noted throughout the procedure. The patient is to be discharged per PACU protocol. The patient is to follow-up with Dr. Cardenas First in the office.      Electronically signed by Bigg Price DPM on 11/30/2022 at 2:37 PM

## 2024-04-01 ENCOUNTER — HOSPITAL ENCOUNTER (INPATIENT)
Age: 59
LOS: 4 days | Discharge: HOME | DRG: 885 | End: 2024-04-05
Attending: PSYCHIATRY & NEUROLOGY | Admitting: PSYCHIATRY & NEUROLOGY

## 2024-04-01 DIAGNOSIS — F41.0 PANIC DISORDER WITHOUT AGORAPHOBIA: ICD-10-CM

## 2024-04-01 DIAGNOSIS — F41.1 GAD (GENERALIZED ANXIETY DISORDER): Chronic | ICD-10-CM

## 2024-04-01 DIAGNOSIS — F32.A DEPRESSION WITH SUICIDAL IDEATION: Primary | ICD-10-CM

## 2024-04-01 DIAGNOSIS — R45.851 DEPRESSION WITH SUICIDAL IDEATION: Primary | ICD-10-CM

## 2024-04-01 DIAGNOSIS — F33.2 SEVERE EPISODE OF RECURRENT MAJOR DEPRESSIVE DISORDER, WITHOUT PSYCHOTIC FEATURES (HCC): Chronic | ICD-10-CM

## 2024-04-01 PROBLEM — F33.0 MDD (MAJOR DEPRESSIVE DISORDER), RECURRENT EPISODE, MILD (HCC): Status: ACTIVE | Noted: 2024-04-01

## 2024-04-01 LAB
ALBUMIN SERPL BCG-MCNC: 4.1 G/DL (ref 3.5–5.1)
ALP SERPL-CCNC: 70 U/L (ref 38–126)
ALT SERPL W/O P-5'-P-CCNC: 8 U/L (ref 11–66)
AMPHETAMINES UR QL SCN: NEGATIVE
ANION GAP SERPL CALC-SCNC: 12 MEQ/L (ref 8–16)
APAP SERPL-MCNC: < 5 UG/ML (ref 0–20)
AST SERPL-CCNC: 12 U/L (ref 5–40)
BACTERIA URNS QL MICRO: ABNORMAL /HPF
BARBITURATES UR QL SCN: NEGATIVE
BASOPHILS ABSOLUTE: 0.1 THOU/MM3 (ref 0–0.1)
BASOPHILS NFR BLD AUTO: 0.7 %
BENZODIAZ UR QL SCN: POSITIVE
BILIRUB CONJ SERPL-MCNC: < 0.2 MG/DL (ref 0–0.3)
BILIRUB SERPL-MCNC: < 0.2 MG/DL (ref 0.3–1.2)
BILIRUB UR QL STRIP.AUTO: NEGATIVE
BUN SERPL-MCNC: 19 MG/DL (ref 7–22)
BZE UR QL SCN: NEGATIVE
CALCIUM SERPL-MCNC: 9 MG/DL (ref 8.5–10.5)
CANNABINOIDS UR QL SCN: NEGATIVE
CASTS #/AREA URNS LPF: ABNORMAL /LPF
CASTS 2: ABNORMAL /LPF
CHARACTER UR: CLEAR
CHLORIDE SERPL-SCNC: 108 MEQ/L (ref 98–111)
CO2 SERPL-SCNC: 24 MEQ/L (ref 23–33)
COLOR: YELLOW
CREAT SERPL-MCNC: 0.6 MG/DL (ref 0.4–1.2)
CRYSTALS URNS MICRO: ABNORMAL
DEPRECATED RDW RBC AUTO: 42.9 FL (ref 35–45)
EOSINOPHIL NFR BLD AUTO: 2.4 %
EOSINOPHILS ABSOLUTE: 0.2 THOU/MM3 (ref 0–0.4)
EPITHELIAL CELLS, UA: ABNORMAL /HPF
ERYTHROCYTE [DISTWIDTH] IN BLOOD BY AUTOMATED COUNT: 12.5 % (ref 11.5–14.5)
ETHANOL SERPL-MCNC: < 0.01 %
FENTANYL: NEGATIVE
GFR SERPL CREATININE-BSD FRML MDRD: > 90 ML/MIN/1.73M2
GLUCOSE SERPL-MCNC: 105 MG/DL (ref 70–108)
GLUCOSE UR QL STRIP.AUTO: NEGATIVE MG/DL
HCT VFR BLD AUTO: 40.5 % (ref 37–47)
HGB BLD-MCNC: 13.6 GM/DL (ref 12–16)
HGB UR QL STRIP.AUTO: NEGATIVE
IMM GRANULOCYTES # BLD AUTO: 0.05 THOU/MM3 (ref 0–0.07)
IMM GRANULOCYTES NFR BLD AUTO: 0.6 %
KETONES UR QL STRIP.AUTO: NEGATIVE
LYMPHOCYTES ABSOLUTE: 3.4 THOU/MM3 (ref 1–4.8)
LYMPHOCYTES NFR BLD AUTO: 37.3 %
MCH RBC QN AUTO: 31.8 PG (ref 26–33)
MCHC RBC AUTO-ENTMCNC: 33.6 GM/DL (ref 32.2–35.5)
MCV RBC AUTO: 94.6 FL (ref 81–99)
MISCELLANEOUS 2: ABNORMAL
MONOCYTES ABSOLUTE: 0.8 THOU/MM3 (ref 0.4–1.3)
MONOCYTES NFR BLD AUTO: 8.9 %
NEUTROPHILS NFR BLD AUTO: 50.1 %
NITRITE UR QL STRIP: NEGATIVE
NRBC BLD AUTO-RTO: 0 /100 WBC
OPIATES UR QL SCN: NEGATIVE
OSMOLALITY SERPL CALC.SUM OF ELEC: 289.5 MOSMOL/KG (ref 275–300)
OXYCODONE: NEGATIVE
PCP UR QL SCN: NEGATIVE
PH UR STRIP.AUTO: 6 [PH] (ref 5–9)
PLATELET # BLD AUTO: 204 THOU/MM3 (ref 130–400)
PMV BLD AUTO: 10.1 FL (ref 9.4–12.4)
POTASSIUM SERPL-SCNC: 3.9 MEQ/L (ref 3.5–5.2)
PROT SERPL-MCNC: 6.3 G/DL (ref 6.1–8)
PROT UR STRIP.AUTO-MCNC: NEGATIVE MG/DL
RBC # BLD AUTO: 4.28 MILL/MM3 (ref 4.2–5.4)
RBC URINE: ABNORMAL /HPF
RENAL EPI CELLS #/AREA URNS HPF: ABNORMAL /[HPF]
SALICYLATES SERPL-MCNC: < 0.3 MG/DL (ref 2–10)
SEGMENTED NEUTROPHILS ABSOLUTE COUNT: 4.5 THOU/MM3 (ref 1.8–7.7)
SODIUM SERPL-SCNC: 144 MEQ/L (ref 135–145)
SP GR UR REFRACT.AUTO: 1.02 (ref 1–1.03)
TSH SERPL DL<=0.005 MIU/L-ACNC: 1.88 UIU/ML (ref 0.4–4.2)
UROBILINOGEN, URINE: 1 EU/DL (ref 0–1)
WBC # BLD AUTO: 9 THOU/MM3 (ref 4.8–10.8)
WBC #/AREA URNS HPF: ABNORMAL /HPF
WBC #/AREA URNS HPF: ABNORMAL /[HPF]
YEAST LIKE FUNGI URNS QL MICRO: ABNORMAL

## 2024-04-01 PROCEDURE — 82077 ASSAY SPEC XCP UR&BREATH IA: CPT

## 2024-04-01 PROCEDURE — 85025 COMPLETE CBC W/AUTO DIFF WBC: CPT

## 2024-04-01 PROCEDURE — 80053 COMPREHEN METABOLIC PANEL: CPT

## 2024-04-01 PROCEDURE — 80179 DRUG ASSAY SALICYLATE: CPT

## 2024-04-01 PROCEDURE — 99285 EMERGENCY DEPT VISIT HI MDM: CPT

## 2024-04-01 PROCEDURE — 1240000000 HC EMOTIONAL WELLNESS R&B

## 2024-04-01 PROCEDURE — 82248 BILIRUBIN DIRECT: CPT

## 2024-04-01 PROCEDURE — 80143 DRUG ASSAY ACETAMINOPHEN: CPT

## 2024-04-01 PROCEDURE — 84443 ASSAY THYROID STIM HORMONE: CPT

## 2024-04-01 PROCEDURE — 81001 URINALYSIS AUTO W/SCOPE: CPT

## 2024-04-01 PROCEDURE — 80307 DRUG TEST PRSMV CHEM ANLYZR: CPT

## 2024-04-01 PROCEDURE — 36415 COLL VENOUS BLD VENIPUNCTURE: CPT

## 2024-04-01 RX ORDER — HYDROXYZINE HYDROCHLORIDE 25 MG/1
50 TABLET, FILM COATED ORAL 3 TIMES DAILY PRN
Status: DISCONTINUED | OUTPATIENT
Start: 2024-04-01 | End: 2024-04-05 | Stop reason: HOSPADM

## 2024-04-01 RX ORDER — TRAZODONE HYDROCHLORIDE 50 MG/1
50 TABLET ORAL NIGHTLY PRN
Status: DISCONTINUED | OUTPATIENT
Start: 2024-04-01 | End: 2024-04-02

## 2024-04-01 RX ORDER — ACETAMINOPHEN 325 MG/1
650 TABLET ORAL EVERY 4 HOURS PRN
Status: DISCONTINUED | OUTPATIENT
Start: 2024-04-01 | End: 2024-04-04

## 2024-04-01 RX ORDER — POLYETHYLENE GLYCOL 3350 17 G/17G
17 POWDER, FOR SOLUTION ORAL DAILY PRN
Status: DISCONTINUED | OUTPATIENT
Start: 2024-04-01 | End: 2024-04-05 | Stop reason: HOSPADM

## 2024-04-01 RX ORDER — NICOTINE 21 MG/24HR
1 PATCH, TRANSDERMAL 24 HOURS TRANSDERMAL DAILY
Status: DISCONTINUED | OUTPATIENT
Start: 2024-04-02 | End: 2024-04-02

## 2024-04-01 RX ORDER — IBUPROFEN 400 MG/1
400 TABLET ORAL EVERY 6 HOURS PRN
Status: DISCONTINUED | OUTPATIENT
Start: 2024-04-01 | End: 2024-04-04

## 2024-04-01 RX ORDER — MAGNESIUM HYDROXIDE/ALUMINUM HYDROXICE/SIMETHICONE 120; 1200; 1200 MG/30ML; MG/30ML; MG/30ML
30 SUSPENSION ORAL EVERY 6 HOURS PRN
Status: DISCONTINUED | OUTPATIENT
Start: 2024-04-01 | End: 2024-04-05 | Stop reason: HOSPADM

## 2024-04-01 ASSESSMENT — PATIENT HEALTH QUESTIONNAIRE - PHQ9
SUM OF ALL RESPONSES TO PHQ QUESTIONS 1-9: 27
9. THOUGHTS THAT YOU WOULD BE BETTER OFF DEAD, OR OF HURTING YOURSELF: NEARLY EVERY DAY
3. TROUBLE FALLING OR STAYING ASLEEP: NEARLY EVERY DAY
6. FEELING BAD ABOUT YOURSELF - OR THAT YOU ARE A FAILURE OR HAVE LET YOURSELF OR YOUR FAMILY DOWN: NEARLY EVERY DAY
SUM OF ALL RESPONSES TO PHQ QUESTIONS 1-9: 27
SUM OF ALL RESPONSES TO PHQ QUESTIONS 1-9: 27
SUM OF ALL RESPONSES TO PHQ QUESTIONS 1-9: 24
5. POOR APPETITE OR OVEREATING: NEARLY EVERY DAY
1. LITTLE INTEREST OR PLEASURE IN DOING THINGS: NEARLY EVERY DAY
SUM OF ALL RESPONSES TO PHQ9 QUESTIONS 1 & 2: 6
7. TROUBLE CONCENTRATING ON THINGS, SUCH AS READING THE NEWSPAPER OR WATCHING TELEVISION: NEARLY EVERY DAY
8. MOVING OR SPEAKING SO SLOWLY THAT OTHER PEOPLE COULD HAVE NOTICED. OR THE OPPOSITE, BEING SO FIGETY OR RESTLESS THAT YOU HAVE BEEN MOVING AROUND A LOT MORE THAN USUAL: NEARLY EVERY DAY
10. IF YOU CHECKED OFF ANY PROBLEMS, HOW DIFFICULT HAVE THESE PROBLEMS MADE IT FOR YOU TO DO YOUR WORK, TAKE CARE OF THINGS AT HOME, OR GET ALONG WITH OTHER PEOPLE: EXTREMELY DIFFICULT
4. FEELING TIRED OR HAVING LITTLE ENERGY: NEARLY EVERY DAY
2. FEELING DOWN, DEPRESSED OR HOPELESS: NEARLY EVERY DAY

## 2024-04-01 ASSESSMENT — SLEEP AND FATIGUE QUESTIONNAIRES
DO YOU USE A SLEEP AID: YES
DO YOU HAVE DIFFICULTY SLEEPING: YES
SLEEP PATTERN: DIFFICULTY FALLING ASLEEP;DISTURBED/INTERRUPTED SLEEP

## 2024-04-01 ASSESSMENT — LIFESTYLE VARIABLES
HOW MANY STANDARD DRINKS CONTAINING ALCOHOL DO YOU HAVE ON A TYPICAL DAY: PATIENT DOES NOT DRINK
HOW OFTEN DO YOU HAVE A DRINK CONTAINING ALCOHOL: NEVER

## 2024-04-01 ASSESSMENT — PAIN - FUNCTIONAL ASSESSMENT: PAIN_FUNCTIONAL_ASSESSMENT: NONE - DENIES PAIN

## 2024-04-02 PROBLEM — F41.1 GAD (GENERALIZED ANXIETY DISORDER): Chronic | Status: ACTIVE | Noted: 2024-04-02

## 2024-04-02 PROBLEM — F41.0 PANIC DISORDER WITHOUT AGORAPHOBIA: Status: ACTIVE | Noted: 2024-04-02

## 2024-04-02 PROBLEM — F33.2 SEVERE EPISODE OF RECURRENT MAJOR DEPRESSIVE DISORDER, WITHOUT PSYCHOTIC FEATURES (HCC): Chronic | Status: ACTIVE | Noted: 2024-04-01

## 2024-04-02 PROCEDURE — 6370000000 HC RX 637 (ALT 250 FOR IP): Performed by: PSYCHIATRY & NEUROLOGY

## 2024-04-02 PROCEDURE — 1240000000 HC EMOTIONAL WELLNESS R&B

## 2024-04-02 RX ORDER — ALPRAZOLAM 0.5 MG/1
0.5 TABLET ORAL 3 TIMES DAILY PRN
Status: DISCONTINUED | OUTPATIENT
Start: 2024-04-02 | End: 2024-04-05 | Stop reason: HOSPADM

## 2024-04-02 RX ORDER — ZOLPIDEM TARTRATE 5 MG/1
10 TABLET ORAL NIGHTLY PRN
Status: DISCONTINUED | OUTPATIENT
Start: 2024-04-02 | End: 2024-04-05 | Stop reason: HOSPADM

## 2024-04-02 RX ORDER — FLUOXETINE HYDROCHLORIDE 20 MG/1
40 CAPSULE ORAL DAILY
Status: DISCONTINUED | OUTPATIENT
Start: 2024-04-02 | End: 2024-04-05 | Stop reason: HOSPADM

## 2024-04-02 RX ADMIN — ZOLPIDEM TARTRATE 10 MG: 5 TABLET ORAL at 20:25

## 2024-04-02 RX ADMIN — TRAZODONE HYDROCHLORIDE 50 MG: 50 TABLET ORAL at 00:14

## 2024-04-02 RX ADMIN — FLUOXETINE HYDROCHLORIDE 40 MG: 20 CAPSULE ORAL at 14:37

## 2024-04-02 RX ADMIN — HYDROXYZINE HYDROCHLORIDE 50 MG: 25 TABLET, FILM COATED ORAL at 00:14

## 2024-04-02 RX ADMIN — ALPRAZOLAM 0.5 MG: 0.5 TABLET ORAL at 20:25

## 2024-04-02 ASSESSMENT — SLEEP AND FATIGUE QUESTIONNAIRES
DO YOU HAVE DIFFICULTY SLEEPING: YES
DO YOU USE A SLEEP AID: YES
SLEEP PATTERN: DIFFICULTY FALLING ASLEEP;DISTURBED/INTERRUPTED SLEEP

## 2024-04-02 NOTE — GROUP NOTE
Group Therapy Note    Date: 4/2/2024    Group Start Time: 1100  Group End Time: 1200  Group Topic: Healthy Living/Wellness    [facility] Adult Psych 4E    Sally Terry LPN        Group Therapy Note    Attendees: 4       Notes:  did not attend    Discipline Responsible: Licensed Practical Nurse, students      Signature:  Sally Terry LPN

## 2024-04-02 NOTE — ED PROVIDER NOTES
[facility] ADULT PSYCH 4E      EMERGENCY MEDICINE     Pt Name: Nyasia Bella  MRN: [id number]  Birthdate 1965  Date of evaluation: 2024  Provider: ROB Flanagan CNP    CHIEF COMPLAINT     No chief complaint on file.    HISTORY OF PRESENT ILLNESS   Nyasia Bella is a pleasant 58 y.o. female who presents to the emergency department from home with c/o suicidal ideation.  Patient has thoughts to overdose on her Xanax.  Patient reports being sad because she misses her parents.  States that she has been very depressed lately      History is obtained from:  patient  PASTMEDICAL HISTORY     Past Medical History:   Diagnosis Date    PFO (patent foramen ovale)     congential and closed    PONV (postoperative nausea and vomiting)        Patient Active Problem List   Diagnosis Code    MDD (major depressive disorder), recurrent episode, mild (HCC) F33.0     SURGICAL HISTORY       Past Surgical History:   Procedure Laterality Date    ARTHRODESIS Left 2022    ARTHRODESIS 1ST METATARSOCUNEIFORM JOINT, DISTAL 1ST METATARSAL OSTEOTOMY, EXCISION OF PLANTAR FIBROMA WITH PLACEMENT OF AMNIOTIC MEMBRANE, PERCUTANEOUS FLEXOR TENOTOMY DIGITS 2,3,4,5 ALL ON THE LEFT FOOT performed by Edilberto Templeton DPM at [facility] OR    BUNIONECTOMY Right 2022    1ST METATARSOCUNEIFORM ARTHRODESIS WITH TITANIUM WEDGE STAPLES RIGHT FOOT DISTAL CHEVRON BUNIONECTOMY WITH SCREW FIXATION RIGHT FOOT AKIN OSTEOTOMY WITH STAPLE FIXATION RIGHT FOOT PERCUTANEOUS FLEXOR TENOTOMY DIGIT 2, 3, 4, 5 RIGHT FOOT EXCISION OF PLANTAR FIBROMA RIGHT FOOT performed by Edilberto Templeton DPM at [facility] OR    CARDIAC SURGERY       SECTION      x2    COLONOSCOPY      EYE SURGERY      at age 2Years??   cross eyed    FOOT TENDON SURGERY Left 2022    PERCUTANEOUS EXTENSOR TENOTOMY DIGITS 2,3,4, 5TH DIGIT LEFT FOOT performed by Edilberto Templeton DPM at [facility] OR    [facility]         CURRENT MEDICATIONS

## 2024-04-02 NOTE — ED NOTES
ED to inpatient nurses report      Chief Complaint:  No chief complaint on file.    Present to ED from: home    MOA:     LOC: alert and orientated to name, place, date  Mobility: Independent  Oxygen Baseline: room air     Current needs required: room air     Code Status:   Full Code    What abnormal results were found and what did you give/do to treat them? NA  Any procedures or intervention occur? NA    Mental Status:  Level of Consciousness: Alert (0)    Psych Assessment:        Vitals:  Patient Vitals for the past 24 hrs:   BP Temp Temp src Pulse Resp SpO2 Height Weight   24 (!) 159/92 97.8 °F (36.6 °C) Oral 74 20 96 % 1.473 m (4' 10\") 47.6 kg (105 lb)        LDAs:      Ambulatory Status:  No data recorded    Diagnosis:  DISPOSITION Admitted 2024 11:25:38 PM   Final diagnoses:   Depression with suicidal ideation        Consults:  None     Pain Score:  Pain Assessment  Pain Assessment: None - Denies Pain    C-SSRS:   Risk of Suicide: High Risk    Sepsis Screening:  Sepsis Risk Score: 0.42    [place] Risk:       Swallow Screening        Preferred Language:   English      ALLERGIES     Doxycycline    SURGICAL HISTORY       Past Surgical History:   Procedure Laterality Date    ARTHRODESIS Left 2022    ARTHRODESIS 1ST METATARSOCUNEIFORM JOINT, DISTAL 1ST METATARSAL OSTEOTOMY, EXCISION OF PLANTAR FIBROMA WITH PLACEMENT OF AMNIOTIC MEMBRANE, PERCUTANEOUS FLEXOR TENOTOMY DIGITS 2,3,4,5 ALL ON THE LEFT FOOT performed by Edilberto Templeton DPM at [facility] OR    BUNIONECTOMY Right 2022    1ST METATARSOCUNEIFORM ARTHRODESIS WITH TITANIUM WEDGE STAPLES RIGHT FOOT DISTAL CHEVRON BUNIONECTOMY WITH SCREW FIXATION RIGHT FOOT AKIN OSTEOTOMY WITH STAPLE FIXATION RIGHT FOOT PERCUTANEOUS FLEXOR TENOTOMY DIGIT 2, 3, 4, 5 RIGHT FOOT EXCISION OF PLANTAR FIBROMA RIGHT FOOT performed by Edilberto Templeton DPM at [facility] OR    CARDIAC SURGERY       SECTION      x2    COLONOSCOPY

## 2024-04-02 NOTE — PROGRESS NOTES
Behavioral Health   Admission Note   Admission Type: Voluntary    Reason for Admission: Plan to overdose on Xanax    Patient Strengths/Barriers  Strengths (Must Choose Two):  (mirielle did not want to finish assessment)  Barriers:  (did not want to finish assessment)    Addictive Behavior  In the Past 3 Months, Have You Felt or Has Someone Told You That You Have a Problem With  :  (mireille did not want to finish assessment)    Medical Problems:   Past Medical History:   Diagnosis Date    PFO (patent foramen ovale)     congential and closed    PONV (postoperative nausea and vomiting)        Status EXAM:  Mental Status and Behavioral Exam  Normal: No  Level of Assistance: Independent/Self  Facial Expression: Sad, Flat, Worried  Affect: Constricted  Level of Consciousness: Confused  Frequency of Checks: 4 times per hour, close  Mood:Normal: No  Mood: Depressed, Anxious  Motor Activity:Normal: Yes  Eye Contact: Fair  Observed Behavior: Withdrawn, Agitated, Exit seeking, Guarded, Preoccupied  Sexual Misconduct History: Current - no  Preception: [place] to person  Attention:Normal: No  Attention: Distractible, Unable to concentrate  Thought Processes: Circumstantial  Thought Content:Normal: Yes  Depression Symptoms: Feelings of hopelessess, Appetite change, Loss of interest, Impaired concentration, Sleep disturbance, Change in energy level  Anxiety Symptoms: Generalized  Mary Symptoms: No problems reported or observed.  Hallucinations: None  Delusions: No  Memory:Normal: No  Memory: Poor recent, Poor remote  Insight and Judgment: No  Insight and Judgment: Poor judgment, Poor insight    Pt admitted with followings belongings:  Dental Appliances: Retainer (s)  Vision - Corrective Lenses: Eyeglasses  Hearing Aid: None  Jewelry: None  Body Piercings Removed: N/A  Clothing: Shirt, Pants  Other Valuables: Sent home     Admission order obtained Yes  Belongings sent home with . Valuables placed in safe in security envelope, number:

## 2024-04-02 NOTE — ED NOTES
Patient moved to safe room at this time. Patient belongings locked in locker. Patient placed in scrubs at this time. Officer in control room monitoring patient continuously.

## 2024-04-02 NOTE — BH NOTE
Exit seeking behaviors. Patient pulling at exit doors on the unit. Elopement precautions initiated.

## 2024-04-02 NOTE — ED NOTES
Patient resting on cot. Patient covered with blanket. Respirations even and unlabored. Family at bedside. Patient denies any needs.

## 2024-04-02 NOTE — PROGRESS NOTES
Oma ORDONEZ has reviewed and agrees with Sally NOVA's shift   Assessment.    Oma Burgess RN  4/2/2024

## 2024-04-02 NOTE — ED TRIAGE NOTES
Patient presents to ED from home with C/O suicidal ideation. Patient states she is having a bad and misses her mom who passed away 8 years ago. Patient states she had intentions of taking all of her Xanax tonight. Patient states she sees Snow Rodriguez as her Therapist. Patient states she saw her Therapist tonight around 1800 before coming in. Patient  and daughter are at bedside. Patient family states patient has history of Depression and anxiety. Patient is tearful at this time but cooperative. VS stable.

## 2024-04-02 NOTE — PROGRESS NOTES
[facility] CRISIS ASSESSMENT    Chief Complaint:   Suicidal        Provisional Diagnosis: Unspecified Depressive Disorder       Risk, Psychosocial and Contextual Factors: (homeless, lack of social support etc.): Fired from job in September 2023      Current  Treatment: Counseling with Snow Rodriguez in [place]        Present Suicidal Behavior:      Verbal:  Denies     Attempt:  Planned to overdoes on her xanax today however aborted and informed her family instead      Access to Weapons:   Denies      C-SSRS Current Suicide Risk: Low, Moderate or High:    High      Past Suicidal Behavior:       Verbal:  X    Attempts:   Overdose 20 years ago, reportedly admitted to [facility] for inpatient psychiatric treatment, state this is her only psychiatric admission      Self-Injurious/Self-Mutilation: Denies       Traumatic Event Within Past 2 Weeks: Denies       Current Abuse:  Denies       Legal: Denies       Violence: Denies       Protective Factors:  Family are supportive       Housing:   Resides with       CPAP/Oxygen/Ambulation Difficulties:   None      Critical Labs:         Risk Factors: See Summary       Clinical Summary:      Pt is a 58 year old female with a history of depression and anxiety escorted to [facility] ED voluntarily by her , Marco, and daughter, Aranza due to an aborted suicide attempt \"I tried to kill myself, I was going to take all my xanax\". Pt reportedly was going to overdose on her prescribed xanax with intent to kill herself however informed her family who escorted her to the ED. Pt denies current suicidal thought, denies homicidal thought, denies hallucinations, no delusions noted. Pt was employed at [employer] for 34 years before she was fired in September 2023 due to mistakenly stepping into a machine to clean it however the machine was not \"locked out\", the incident was reported.  Pt report increased depression since this incident, loss of sleep, loss of appetite (loss 15 pounds since September).  Pt was

## 2024-04-02 NOTE — PROGRESS NOTES
Psychosocial Assessment    Current Level of Psychosocial Functioning     Independent   Dependent        XXX  Minimal Assist      XXX    Comments:      Psychosocial High Risk Factors (check all that apply)    Unable to obtain meds   Chronic illness/pain    Substance abuse   Lack of Family Support   Financial stress   Isolation   Inadequate Community Resources  Suicide attempt(s)       XXX  Not taking medications   Victim of crime   Developmental Delay  Unable to manage personal needs    Age 65 or older   Homeless  No transportation   Readmission within 30 days  Unemployment       XXX Fired from her job of 34 years  Traumatic Event    Family/Supports identified:         , children, siblings    Sexual Orientation:         Heterosexual    Patient Strengths:       Stable home, good support, seeking help. Connected to treatment.     Patient Barriers:        No significant barriers are identified.     Safety plan:        Contracts for safety    CMHC/ history:       XXXX    Plan of Care:  medication management, group/individual therapies, family meetings, psycho -education, treatment team meetings to assist with stabilization    Initial Discharge Plan:  [facility] P[facility] for therapy.     Clinical Summary:  Nyasia is a , 58 year old female is admitted to his unit voluntarily following an aborted suicide attempt. She intend to overdose on her medication however chose to tell her family. Nyasia endorses a long history of depression beginning about the age of 6. She has been hospitalized one other time over 20 years ago and did have a suicide attempt at that time. She reports that her depression has been stable and well managed until she was fired from her job in September of 2023. Pt had been employed at [employer] for 34 years. She continues to have a difficult time coping with this event and feels as though she had lost her purpose. Pt is  and has 2

## 2024-04-02 NOTE — PROGRESS NOTES
Behavioral Health  Initial Interdisciplinary Treatment Plan NOTE    REVIEW DATE AND TIME: 04/02/24  1530    PATIENT was IN TREATMENT TEAM.  See Multidisciplinary Treatment Team sheet for participants.    ADMISSION TYPE:   Admission Type: Voluntary    REASON FOR ADMISSION:  Reason for Admission: Plan to overdose on Xanax      Estimated Length of Stay Update:  04/03/24  Estimated Discharge Date Update: 3-5 days    Patient Strengths/Barriers  Strengths (Must Choose Two):  (mireille did not want to finish assessment)  Barriers:  (did not want to finish assessment)  Addictive Behavior:Addictive Behavior  In the Past 3 Months, Have You Felt or Has Someone Told You That You Have a Problem With  :  (mireille did not want to finish assessment)  Medical Problems:  Past Medical History:   Diagnosis Date    PFO (patent foramen ovale)     congential and closed    PONV (postoperative nausea and vomiting)        EDUCATION:   Learner Progress Toward Treatment Goals: Reviewed results and recommendations of this team, Reviewed group plan and strategies, Reviewed signs, symptoms and risk of self harm and violent behavior, and Reviewed goals and plan of care    Method: Individual    Outcome: Verbalized understanding and Needs reinforcement    PATIENT GOALS: To go home. Stabilize pt's psychiatric symptoms and her risk for self harm.     OQ PRIORITIES IDENTIFIED BY THE PATIENT ON ADMISSION: (These are the symptoms that the patient has identified that are impacting them the most at the time of admission based on their own input on the OQ.  These priorities are to be included in all of their care while admitted.)        ND    PLAN/TREATMENT RECOMMENDATIONS UPDATE:   What is the most important thing we can help you with while you are here? To go home  Who is your support system? Pt has good family support, , children, siblings  Do you have follow-up providers? [facility] P[facility]rez for therapy  Do you have the ability to pay for your medications?

## 2024-04-03 PROCEDURE — 1240000000 HC EMOTIONAL WELLNESS R&B

## 2024-04-03 PROCEDURE — 6370000000 HC RX 637 (ALT 250 FOR IP): Performed by: PSYCHIATRY & NEUROLOGY

## 2024-04-03 RX ADMIN — IBUPROFEN 400 MG: 400 TABLET, FILM COATED ORAL at 19:42

## 2024-04-03 RX ADMIN — HYDROXYZINE HYDROCHLORIDE 50 MG: 25 TABLET, FILM COATED ORAL at 20:40

## 2024-04-03 RX ADMIN — ALPRAZOLAM 0.5 MG: 0.5 TABLET ORAL at 19:29

## 2024-04-03 RX ADMIN — FLUOXETINE HYDROCHLORIDE 40 MG: 20 CAPSULE ORAL at 08:29

## 2024-04-03 RX ADMIN — ZOLPIDEM TARTRATE 10 MG: 5 TABLET ORAL at 20:40

## 2024-04-03 ASSESSMENT — PAIN DESCRIPTION - ORIENTATION: ORIENTATION: POSTERIOR

## 2024-04-03 ASSESSMENT — PAIN DESCRIPTION - LOCATION: LOCATION: NECK

## 2024-04-03 ASSESSMENT — PAIN DESCRIPTION - PAIN TYPE: TYPE: ACUTE PAIN

## 2024-04-03 ASSESSMENT — PAIN DESCRIPTION - DESCRIPTORS: DESCRIPTORS: ACHING;DISCOMFORT

## 2024-04-03 ASSESSMENT — PAIN SCALES - GENERAL
PAINLEVEL_OUTOF10: 10
PAINLEVEL_OUTOF10: 0

## 2024-04-03 ASSESSMENT — PAIN DESCRIPTION - FREQUENCY: FREQUENCY: INTERMITTENT

## 2024-04-03 ASSESSMENT — PAIN - FUNCTIONAL ASSESSMENT: PAIN_FUNCTIONAL_ASSESSMENT: ACTIVITIES ARE NOT PREVENTED

## 2024-04-03 ASSESSMENT — PAIN DESCRIPTION - ONSET: ONSET: ON-GOING

## 2024-04-03 NOTE — PROGRESS NOTES
Daily Progress Note  Kevin Dunn MD  4/3/2024    Reviewed patient's current plan of care and vital signs with nursing staff.  Sleep:  8.5 hours last night  Attending groups: Yes  No reported Suicidal thought; some interaction with peers & staff. Mood 5 on a scale of 1 to 10 with 10 is feeling normal.  She has some hope.    SUBJECTIVE:    Patient is feeling better. SUICIDAL IDEATION denies suicidal ideation.  Patient does not have medication side effects.    ROS: Patient has new complaints:  No  Sleeping adequately:  Yes  Visitors: Yes    Mental Status Examination:  Patient is cooperative. Speech: Normal rate and tone.  No abnormal movements, tics or mannerisms.  Mood dysthymic; affect normal affect. Suicidal ideation Absent.  Homicidal ideations Absent.   Hallucinations Absent.  Delusions Absent. Thought Content: normal. Thought Processes: Goal-Directed. Alert and oriented X 3.  Attention and concentration fair. MEMORY intact.  Insight and Judgement fair.      Data   height is 1.473 m (4' 9.99\") and weight is 47.6 kg (105 lb). Her oral temperature is 98.6 °F (37 °C). Her blood pressure is 126/79 and her pulse is 76. Her respiration is 18 and oxygen saturation is 98%.   Labs:   Admission on 04/01/2024   Component Date Value Ref Range Status    Acetaminophen Level 04/01/2024 < 5.0  0.0 - 20.0 ug/mL Final    Performed at [facility] [address] [place] [id number]    Sodium 04/01/2024 144  135 - 145 meq/L Final    Potassium 04/01/2024 3.9  3.5 - 5.2 meq/L Final    Comment: Low level specimen hemolysis is present as indicated by the interference level  index on the Roche analyzer.  The reported K+ level may be falsely increased.  If clinically warranted, recollection of the specimen is suggested.      Chloride 04/01/2024 108  98 - 111 meq/L Final    CO2 04/01/2024 24  23 - 33 meq/L Final    Glucose 04/01/2024 105  70 - 108 mg/dL Final    BUN 04/01/2024 19  7 - 22 mg/dL Final    Creatinine 04/01/2024 0.6  0.4

## 2024-04-03 NOTE — PROGRESS NOTES
This RN has reviewed and agrees with Laura Molina LPN's data collection and has collaborated with this LPN regarding the patient's care plan.

## 2024-04-03 NOTE — PROGRESS NOTES
Behavioral Health   Day 3 Interdisciplinary Treatment Plan NOTE    Review Date & Time: 04/03/24  1520    Patient was in treatment team    Admission Type:   Admission Type: Voluntary    Reason for admission:  Reason for Admission: Plan to overdose on Xanax  Estimated Length of Stay Update:  04/07/24  Estimated Discharge Date Update: 2-4 daysN    Patient Strengths/Barriers  Strengths (Must Choose Two):  (mireille did not want to finish assessment)  Barriers:  (did not want to finish assessment)  Addictive Behavior:Addictive Behavior  In the Past 3 Months, Have You Felt or Has Someone Told You That You Have a Problem With  :  (mireille did not want to finish assessment)  Medical Problems:  Past Medical History:   Diagnosis Date    PFO (patent foramen ovale)     congential and closed    PONV (postoperative nausea and vomiting)        Risk:  Fall Risk   Jorge Scale Jorge Scale Score: 23    Status EXAM:   Mental Status and Behavioral Exam  Normal: No  Level of Assistance: Independent/Self  Facial Expression: Sad, Worried, Flat, Avoids Gaze  Affect: Blunt, Unstable  Level of Consciousness: Confused  Frequency of Checks: 4 times per hour, close  Mood:Normal: No  Mood: Depressed, Anxious, Helpless, Sad  Motor Activity:Normal: No  Motor Activity: Decreased  Eye Contact: Fair  Observed Behavior: Preoccupied, Withdrawn, Impulsive, Exit seeking  Sexual Misconduct History: Current - no  Preception: Bartlett to person  Attention:Normal: No  Attention: Distractible, Unable to concentrate  Thought Processes: Flight of ideas, Loose association  Thought Content:Normal: No  Thought Content: Paranoia, Preoccupations  Depression Symptoms: Impaired concentration, Feelings of hopelessess, Sleep disturbance, Change in energy level  Anxiety Symptoms: Generalized  Mary Symptoms: No problems reported or observed.  Hallucinations: None  Delusions: Yes  Delusions: Paranoid  Memory:Normal: No  Memory: Poor recent, Poor remote  Insight and Judgment:  Patient notified of lab results

## 2024-04-03 NOTE — H&P
[address] [address]                           HISTORY & PHYSICAL      PATIENT NAME: GINGER BOURNE           : 1965  MED REC NO: [id number]                       ROOM: [facility]  ACCOUNT NO: [id number]                       ADMIT DATE: 2024  PROVIDER: Kevin Dunn MD      IDENTIFYING INFORMATION:  The patient is a 58-year-old   female.  She is the mother of 2 children.  She lives with her .  She is unemployed.    CHIEF COMPLAINT:  \"I guess I was going to hurt myself.\"    HISTORY OF PRESENT ILLNESS:  The patient was brought to [facility] ED by her  and her daughter.  The patient has been having suicidal thoughts with plan to take an overdose of alprazolam.  She texted her sister who told her .  She has a long history of depression, but her depression worsened last September after she got fired from her job of 34 years.  She made a mistake at work and got fired.  Past few weeks she has been having suicidal thoughts.  She stated that her children keep her from hurting herself.  She has trouble getting to sleep.  She feels hopeless and worthless.  She has poor energy, poor concentration, decreased interest in pleasurable activities, and poor appetite.  She may have lost 20 pounds within the past 2 months.  She reports feeling good at times.  Last time she was feeling good was last summer.  Upon further questioning, she feels so sad at times and other times she may feel somewhat better, but she denies hypomanic or manic symptoms.  She denies psychotic symptoms.  She feels anxious all the time and she worries a lot with muscle tension.  She also reports anxiety attack associated with shortness of breath, increased heart rate, feeling shaky and sweaty, feeling sick to her stomach, feeling dizzy, etc.  Those symptoms may last 20 minutes.  She may have a few of them daily.  She has

## 2024-04-03 NOTE — BH NOTE
Family contact info   Marco ()  [phone]  Bessie(sister)  [phone]  Anaya(sister)  [phone]

## 2024-04-03 NOTE — BH NOTE
Patient did not attend group. Patient in room and when staff around acts as though she can not walk or do for herself but is walking and trying doors to leave. Patient is alert and oriented to name the rest comes and goes. She continued throughout the night trying the main door and when redirected she would tell me her children are coming tomorrow. Patient unable to state why she is here but told me  \"I wants to go home and ride her motorcycle, the weather is nice and it time to ride\"

## 2024-04-04 PROCEDURE — 1240000000 HC EMOTIONAL WELLNESS R&B

## 2024-04-04 PROCEDURE — 6370000000 HC RX 637 (ALT 250 FOR IP): Performed by: PSYCHIATRY & NEUROLOGY

## 2024-04-04 RX ORDER — IBUPROFEN 400 MG/1
400 TABLET ORAL EVERY 6 HOURS PRN
Status: DISCONTINUED | OUTPATIENT
Start: 2024-04-04 | End: 2024-04-05 | Stop reason: HOSPADM

## 2024-04-04 RX ORDER — ACETAMINOPHEN 325 MG/1
650 TABLET ORAL EVERY 4 HOURS PRN
Status: DISCONTINUED | OUTPATIENT
Start: 2024-04-04 | End: 2024-04-05 | Stop reason: HOSPADM

## 2024-04-04 RX ADMIN — FLUOXETINE HYDROCHLORIDE 40 MG: 20 CAPSULE ORAL at 08:17

## 2024-04-04 RX ADMIN — ZOLPIDEM TARTRATE 10 MG: 5 TABLET ORAL at 20:47

## 2024-04-04 RX ADMIN — ALPRAZOLAM 0.5 MG: 0.5 TABLET ORAL at 20:01

## 2024-04-04 ASSESSMENT — PAIN SCALES - GENERAL
PAINLEVEL_OUTOF10: 0
PAINLEVEL_OUTOF10: 0

## 2024-04-04 NOTE — PROGRESS NOTES
Family-I spoke with pt's  concerning the discharge process and follow up plans.  request that the prescriptions be sent to [facility] in [facility].  shares that they are financially sound and he has encouraged pt to look at new opportunities to replace work which would make her feel useful. Pt has good support.

## 2024-04-04 NOTE — DISCHARGE INSTR - DIET

## 2024-04-04 NOTE — BH NOTE
Patient has been seen multiple time pulling on doors trying to exit the facility. She was keeping the phones and refusing to give them back to staff. Patient is continuously up at nurses asking to go home and to uses staffs personal phones. While doing rounds this writer noticed that the patient dumped all of he pop on the empty bed beside her when asked why she did this patient stated, \" because I don't want it any more and I want to go home.\" Patient has also been redirected multiple times to not go into other patients room and patient is still continuing to do so. Patient was also seen looking into other patients rooms on the unit. Patient was instructed that this behavior is inappropriate and not to keep doing it.

## 2024-04-04 NOTE — BH NOTE
Group Therapy Note    Date: 4/4/2024  Start Time: 1400   End Time:  1430  Number of Participants: 5    Type of Group: Healthy Living/Wellness    Notes:  Patient participated in group and helped with the discharge process by utilizing critical thinking skills and evaluating the precipitating factors that brought them to this unit. Patient was able to identify and list coping skills and a support system that they can utilize during times of crisis.      Discipline Responsible: Licensed Practical Nurse      Signature:  Laura Molina LPN

## 2024-04-04 NOTE — PROGRESS NOTES
Daily Progress Note  Kevin Dunn MD  4/4/2024    Reviewed patient's current plan of care and vital signs with nursing staff.  Sleep:  8 hours last night  Attending groups: Yes  No reported Suicidal thought; fair interaction with peers & staff. Mood 7 on a scale of 1 to 10 with 10 is feeling normal.  She has some hope.  Last night she was trying to elope again.  She dumped a Pepsi bottle drink on the empty bed in her room and told staff she did it in order to leave. She has this type of behavior usually after family visits since she wants to go home.  Her  called yesterday and wanted a family meeting.  I was not able to have a family meeting but I was able to call patient's  on the phone.  We had a long conversation talking about patient's symptoms and outpatient follow-ups.   reported that he was planning to keep patient meds under lock which I encouraged.  He does understand and agree with the whole process.    SUBJECTIVE:    Patient is feeling better. SUICIDAL IDEATION denies suicidal ideation.  Patient does not have medication side effects.    ROS: Patient has new complaints:  No  Sleeping adequately:  Yes  Visitors: Yes    Mental Status Examination:  Patient is cooperative. Speech: Normal rate and tone.  No abnormal movements, tics or mannerisms.  Mood dysthymic; affect normal affect. Suicidal ideation Absent.  Homicidal ideations Absent.   Hallucinations Absent.  Delusions Absent. Thought Content: normal. Thought Processes: Goal-Directed. Alert and oriented X 3.  Attention and concentration fair. MEMORY intact.  Insight and Judgement fair.      Data   height is 1.473 m (4' 9.99\") and weight is 47.6 kg (105 lb). Her tympanic temperature is 97.1 °F (36.2 °C). Her blood pressure is 131/76 and her pulse is 66. Her respiration is 16 and oxygen saturation is 99%.   Labs:            Medications  Current Facility-Administered Medications: acetaminophen (TYLENOL) tablet 650 mg, 650 mg, Oral, Q4H

## 2024-04-04 NOTE — BH NOTE
This RN has reviewed and agrees with KEVIN Molina LPN's data collection and has collaborated with this LPN regarding the patient's care plan.

## 2024-04-04 NOTE — DISCHARGE INSTRUCTIONS
[facility] Hotline:  [phone]    Crisis phone numbers:  [facility], and [facility] [phone].  [facility], and [facility] [phone]  [facility] [place] [phone].  [facility] [phone].  [facility] [phone].  [facility] [phone].    [facility]  [address], [place] [address]  [phone]    [facility]  [address] [facility]  [address], [place] [address]  [place], [place] [id number] [phone]    [facility]  [address] [address] [address] [facility] [place] [address]  [phone]    [facility]  Rec[facility]  [address] [place] [address]  [phone]    [facility]  [address] [place] [address]  [phone]    [facility]  [address], [place] [id number] [phone]    [facility]  [address] [place] [address]  [phone]    [facility]  Recovery and Wellness Center  [address], [place] [id number] [phone]    [facility]  [address] [address]  [phone]    [address] [address]  [phone]    [facility]  [address] [place], [place] [address] [phone]    [facility]  [address] [place] [address]  [phone]    [facility]  [address] [place], [place] [address] [phone]

## 2024-04-04 NOTE — PROGRESS NOTES
Pt did not attend group this shift.  Pt was constantly needing redirected this shift.  Tried to open doors multiple times this shift with a lot of redirection.  Pt was found in another patient's room, was redirected and a name sign was posted at her door.  Pt tore down the other name sign that was on door.  Pt constantly asking if her  was on unit, when she could use phone and if she was being discharged.  Pt was given medication per order.

## 2024-04-05 VITALS
BODY MASS INDEX: 22.04 KG/M2 | HEIGHT: 58 IN | HEART RATE: 73 BPM | DIASTOLIC BLOOD PRESSURE: 87 MMHG | WEIGHT: 105 LBS | TEMPERATURE: 98.5 F | OXYGEN SATURATION: 98 % | RESPIRATION RATE: 16 BRPM | SYSTOLIC BLOOD PRESSURE: 116 MMHG

## 2024-04-05 PROCEDURE — 5130000000 HC BRIDGE APPOINTMENT

## 2024-04-05 PROCEDURE — 6370000000 HC RX 637 (ALT 250 FOR IP): Performed by: PSYCHIATRY & NEUROLOGY

## 2024-04-05 RX ORDER — ZOLPIDEM TARTRATE 10 MG/1
10 TABLET ORAL NIGHTLY PRN
Qty: 30 TABLET | Refills: 0 | Status: SHIPPED | OUTPATIENT
Start: 2024-04-05 | End: 2024-05-05

## 2024-04-05 RX ORDER — ALPRAZOLAM 0.5 MG/1
0.5 TABLET ORAL 3 TIMES DAILY PRN
Qty: 60 TABLET | Refills: 0 | Status: SHIPPED | OUTPATIENT
Start: 2024-04-05 | End: 2024-05-05

## 2024-04-05 RX ORDER — FLUOXETINE HYDROCHLORIDE 40 MG/1
40 CAPSULE ORAL DAILY
Qty: 30 CAPSULE | Refills: 0 | Status: SHIPPED | OUTPATIENT
Start: 2024-04-06

## 2024-04-05 RX ADMIN — FLUOXETINE HYDROCHLORIDE 40 MG: 20 CAPSULE ORAL at 08:11

## 2024-04-05 ASSESSMENT — PAIN SCALES - GENERAL: PAINLEVEL_OUTOF10: 0

## 2024-04-05 ASSESSMENT — PAIN DESCRIPTION - FREQUENCY: FREQUENCY: INTERMITTENT

## 2024-04-05 NOTE — TRANSITION OF CARE
/hpf    WBC, UA 2-4 0-4/hpf /hpf    Epithelial Cells, UA 0-2 3-5/hpf /hpf    Bacteria, UA NONE SEEN FEW/NONE SEEN /hpf    Casts UA NONE SEEN NONE SEEN /lpf    Crystals, UA NONE SEEN NONE SEEN    Renal Epithelial, UA NONE SEEN NONE SEEN    Yeast, UA NONE SEEN NONE SEEN    CASTS 2 NONE SEEN NONE SEEN /lpf    MISCELLANEOUS 2 NONE SEEN        Immunizations administered during this encounter:   Immunization History   Administered Date(s) Administered    COVID-19, MODERNA BLUE border, Primary or Immunocompromised, (age 12y+), IM, 100 mcg/0.5mL 03/25/2021, 04/22/2021    COVID-19, MODERNA Booster BLUE border, (age 18y+), IM, 50mcg/0.25mL 01/15/2022     None of the above/Not documented/Unable to determine from medical record documentation    Screening for Metabolic Disorders for Patients on Antipsychotic Medications  (Data obtained from the EMR)    Estimated Body Mass Index  Body mass index is 21.95 kg/m².      Vital Signs/Blood Pressure  /87   Pulse 73   Temp 98.5 °F (36.9 °C) (Oral)   Resp 16   Ht 1.473 m (4' 9.99\")   Wt 47.6 kg (105 lb)   SpO2 98%   BMI 21.95 kg/m²      Fasting Blood Glucose or Hemoglobin A1c  No results found for: \"GLU\", \"GLUCPOC\"    No results found for: \"LABA1C\", \"UUN9AVXA\"    Discharge Diagnosis: MDD    Discharge Plan/Destination: home    Discharge Medication List and Instructions:      Medication List        CHANGE how you take these medications      ALPRAZolam 0.5 MG tablet  Commonly known as: XANAX  Take 1 tablet by mouth 3 times daily as needed for Anxiety for up to 30 days. Max Daily Amount: 1.5 mg  What changed:   medication strength  how much to take  reasons to take this     FLUoxetine 40 MG capsule  Commonly known as: PROzac  Take 1 capsule by mouth daily  Start taking on: April 6, 2024  What changed:   medication strength  how much to take     zolpidem 10 MG tablet  Commonly known as: AMBIEN  Take 1 tablet by mouth nightly as needed for Sleep for up to 30 days. Max Daily Amount:

## 2024-04-05 NOTE — PROGRESS NOTES
Daily Progress Note  Kevin Dunn MD  4/5/2024    Reviewed patient's current plan of care and vital signs with nursing staff.  Sleep:  8 hours last night broken  Attending groups: Yes  No reported Suicidal thought; fair interaction with peers & staff. Mood 10 on a scale of 1 to 10 with 10 is feeling normal.  She had no behavioral issues last night.  She is hopeful and feels ready for discharge.    SUBJECTIVE:    Patient is feeling better. SUICIDAL IDEATION denies suicidal ideation.  Patient does not have medication side effects.    ROS: Patient has new complaints:  No  Sleeping adequately:  Yes  Visitors: Yes  \"I feel better, I need to go home\"    Mental Status Examination:  Patient is cooperative. Speech: Normal rate and tone.  No abnormal movements, tics or mannerisms.  Mood euthymic; affect normal affect. Suicidal ideation Absent.  Homicidal ideations Absent.   Hallucinations Absent.  Delusions Absent. Thought Content: normal. Thought Processes: Goal-Directed. Alert and oriented X 3.  Attention and concentration fair. MEMORY intact.  Insight and Judgement fair.      Data   height is 1.473 m (4' 9.99\") and weight is 47.6 kg (105 lb). Her tympanic temperature is 96.1 °F (35.6 °C) (abnormal). Her blood pressure is 123/73 and her pulse is 78. Her respiration is 16 and oxygen saturation is 94%.   Labs:            Medications  Current Facility-Administered Medications: acetaminophen (TYLENOL) tablet 650 mg, 650 mg, Oral, Q4H PRN  ibuprofen (ADVIL;MOTRIN) tablet 400 mg, 400 mg, Oral, Q6H PRN  ALPRAZolam (XANAX) tablet 0.5 mg, 0.5 mg, Oral, TID PRN  FLUoxetine (PROZAC) capsule 40 mg, 40 mg, Oral, Daily  zolpidem (AMBIEN) tablet 10 mg, 10 mg, Oral, Nightly PRN  polyethylene glycol (GLYCOLAX) packet 17 g, 17 g, Oral, Daily PRN  hydrOXYzine HCl (ATARAX) tablet 50 mg, 50 mg, Oral, TID PRN  aluminum & magnesium hydroxide-simethicone (MAALOX) 200-200-20 MG/5ML suspension 30 mL, 30 mL, Oral, Q6H PRN    ASSESSMENT  Severe episode

## 2024-04-05 NOTE — PROGRESS NOTES
Discharge planning-Nyasia is scheduled for a diagnostic assessmsnet on 04/12/24 at 0800 am with Dagmar Tangman

## 2024-04-05 NOTE — PLAN OF CARE
Problem: Depression  Goal: Will be euthymic at discharge  Description: INTERVENTIONS:  1. Administer medication as ordered  2. Provide emotional support via 1:1 interaction with staff  3. Encourage involvement in milieu/groups/activities  4. Monitor for social isolation  Outcome: Not Progressing  Note: Patient reports continued depression and anxiety that are unchanged since admission.      Problem: Behavior  Goal: Pt/Family maintain appropriate behavior and adhere to behavioral management agreement, if implemented  Description: INTERVENTIONS:  1. Assess patient/family's coping skills and  non-compliant behavior (including use of illegal substances)  2. Notify security of behavior or suspected illegal substances which indicate the need for search of the family and/or belongings  3. Encourage verbalization of thoughts and concerns in a socially appropriate manner  4. Utilize positive, consistent limit setting strategies supporting safety of patient, staff and others  5. Encourage participation in the decision making process about the behavioral management agreement  6. If a visitor's behavior poses a threat to safety call refer to organization policy.  7. Initiate consult with , Psychosocial CNS, Spiritual Care as appropriate  Outcome: Not Progressing  Flowsheets (Taken 4/2/2024 0800)  Patient/family maintains appropriate behavior and adheres to behavioral management agreement, if implemented: Utilize positive, consistent limit setting strategies supporting safety of patient, staff and others  Note: Patient remains confused. Isolates to assigned room. Encouraged patient to come out. Unable to find assigned room again when out.     Problem: Anxiety  Goal: Will report anxiety at manageable levels  Description: INTERVENTIONS:  1. Administer medication as ordered  2. Teach and rehearse alternative coping skills  3. Provide emotional support with 1:1 interaction with staff  Outcome: Not 
  Problem: Self Harm/Suicidality  Goal: Will have no self-injury during hospital stay  Description: INTERVENTIONS:  1.  Ensure constant observer at bedside with Q15M safety checks  2.  Maintain a safe environment  3.  Secure patient belongings  4.  Ensure family/visitors adhere to safety recommendations  5.  Ensure safety tray has been added to patient's diet order  6.  Every shift and PRN: Re-assess suicidal risk via Frequent Screener    4/3/2024 2334 by Sheron Villalpando RN  Outcome: Not Progressing  Flowsheets (Taken 4/3/2024 2334)  Will have no self-injury during hospital stay:   Maintain a safe environment   Every shift and PRN: Re-assess suicidal risk via Frequent Screener  Note: Continues to be suicidal with plan to overdose on medications.  Contracts for safety while on unit.   4/3/2024 1126 by Laura Molina LPN  Outcome: Progressing  Flowsheets (Taken 4/3/2024 0730)  Will have no self-injury during hospital stay: Maintain a safe environment  Note: Patient is free from self harm at this point in the shfit     Problem: Risk for Elopement  Goal: Patient will not exit the unit/facility without proper excort  4/3/2024 2334 by Sheron Villalpando, RN  Outcome: Not Progressing  Flowsheets (Taken 4/3/2024 2334)  Nursing Interventions for Elopement Risk:   Communicate to physician the risk for elopement   Reduce environmental triggers  Note: Pt has tried all the doors multiple times this shift. Redirected multiple times but continues to need redirected.    4/3/2024 1126 by Laura Molina LPN  Outcome: Progressing  Flowsheets (Taken 4/3/2024 0730)  Nursing Interventions for Elopement Risk:   Communicate/escalate to charge nurse the risk of elopement   Communicate to physician the risk for elopement  Note: Patient has made no attempt to leave the facility at this point in the shift        Problem: Depression  Goal: Will be euthymic at discharge  Description: INTERVENTIONS:  1. Administer medication as ordered  2. 
  Problem: Self Harm/Suicidality  Goal: Will have no self-injury during hospital stay  Description: INTERVENTIONS:  1.  Ensure constant observer at bedside with Q15M safety checks  2.  Maintain a safe environment  3.  Secure patient belongings  4.  Ensure family/visitors adhere to safety recommendations  5.  Ensure safety tray has been added to patient's diet order  6.  Every shift and PRN: Re-assess suicidal risk via Frequent Screener    4/4/2024 1133 by Laura Molina LPN  Outcome: Progressing  Flowsheets (Taken 4/4/2024 1108)  Will have no self-injury during hospital stay: Maintain a safe environment  Note: Patient is free from self harm at this point in the shift    4/3/2024 2334 by Sheron Villalpando RN  Outcome: Not Progressing  Flowsheets (Taken 4/3/2024 2334)  Will have no self-injury during hospital stay:   Maintain a safe environment   Every shift and PRN: Re-assess suicidal risk via Frequent Screener  Note: Continues to be suicidal with plan to overdose on medications.  Contracts for safety while on unit.      Problem: Risk for Elopement  Goal: Patient will not exit the unit/facility without proper excort  4/4/2024 1133 by Laura Molina LPN  Outcome: Progressing  Flowsheets (Taken 4/4/2024 1108)  Nursing Interventions for Elopement Risk:   Communicate to physician the risk for elopement   Reduce environmental triggers  Note: Patient has made no attempt to exit the facility at this point in the shift     4/3/2024 2334 by Sheron Villalpando, RN  Outcome: Not Progressing  Flowsheets (Taken 4/3/2024 2334)  Nursing Interventions for Elopement Risk:   Communicate to physician the risk for elopement   Reduce environmental triggers  Note: Pt has tried all the doors multiple times this shift. Redirected multiple times but continues to need redirected.       Problem: Depression  Goal: Will be euthymic at discharge  Description: INTERVENTIONS:  1. Administer medication as ordered  2. Provide emotional support 
behavior (including use of illegal substances)   Notify security of behavior or suspected illegal substances which indicate the need for search of the patient and/or belongings   Encourage verbalization of thoughts and concerns in a socially appropriate manner  Note: Patient is able to maintain appropriate behavior at this point in the shift     Problem: Anxiety  Goal: Will report anxiety at manageable levels  Description: INTERVENTIONS:  1. Administer medication as ordered  2. Teach and rehearse alternative coping skills  3. Provide emotional support with 1:1 interaction with staff  4/4/2024 2324 by Sheron Villalpando RN  Outcome: Not Progressing  Flowsheets (Taken 4/4/2024 2324)  Will report anxiety at manageable levels:   Administer medication as ordered   Provide emotional support with 1:1 interaction with staff  Note: Continue to be anxious this shift. Requested xanax   4/4/2024 1133 by Laura Molina LPN  Outcome: Progressing  Flowsheets (Taken 4/4/2024 1108)  Will report anxiety at manageable levels:   Administer medication as ordered   Provide emotional support with 1:1 interaction with staff   Teach and rehearse alternative coping skills  Note: Patient is endorsing some anxiety, but declined medication at this point in the shift        Problem: Sleep Disturbance  Goal: Will exhibit normal sleeping pattern  Description: INTERVENTIONS:  1. Administer medication as ordered  2. Decrease environmental stimuli, including noise, as appropriate  3. Discourage social isolation and naps during the day  4/4/2024 2324 by Sheron Villalpando RN  Outcome: Progressing  Note: Slept 8 hours last evening. Requested Ambien this shift.   4/4/2024 1133 by Laura Molina LPN  Outcome: Progressing  Note: Patient slept 8C       Problem: Self Care Deficit  Goal: Return ADL status to a safe level of function  Description: INTERVENTIONS:  1. Administer medication as ordered  2. Assess ADL deficits and provide assistive devices as 
patient has all necessary personal care items   Shoes and clothing collected and placed in gown attire  Taken 4/2/2024 0000 by Alysia Mac, RN  Nursing Interventions for Elopement Risk:   Communicate to physician the risk for elopement   Reduce environmental triggers   Make sure patient has all necessary personal care items   Shoes and clothing collected and placed in gown attire  Note: Patient does not leave unit to this point. Remains elopement risk due to memory dysfunction/cognitive dysfunction.      Problem: Depression  Goal: Will be euthymic at discharge  Description: INTERVENTIONS:  1. Administer medication as ordered  2. Provide emotional support via 1:1 interaction with staff  3. Encourage involvement in milieu/groups/activities  4. Monitor for social isolation  4/3/2024 0011 by Lora Shah, RN  Outcome: Not Progressing  Note: Patient reports her mood is 1/10 and her anxiety and depression are high  4/2/2024 1254 by Oma Burgess, RN  Outcome: Not Progressing  Note: Patient reports continued depression and anxiety that are unchanged since admission.      Problem: Behavior  Goal: Pt/Family maintain appropriate behavior and adhere to behavioral management agreement, if implemented  Description: INTERVENTIONS:  1. Assess patient/family's coping skills and  non-compliant behavior (including use of illegal substances)  2. Notify security of behavior or suspected illegal substances which indicate the need for search of the family and/or belongings  3. Encourage verbalization of thoughts and concerns in a socially appropriate manner  4. Utilize positive, consistent limit setting strategies supporting safety of patient, staff and others  5. Encourage participation in the decision making process about the behavioral management agreement  6. If a visitor's behavior poses a threat to safety call refer to organization policy.  7. Initiate consult with , Psychosocial CNS, Spiritual Care as 
supporting safety of patient, staff and others  5. Encourage participation in the decision making process about the behavioral management agreement  6. If a visitor's behavior poses a threat to safety call refer to organization policy.  7. Initiate consult with , Psychosocial CNS, Spiritual Care as appropriate  4/3/2024 1126 by Laura Molina LPN  Outcome: Progressing  Flowsheets (Taken 4/3/2024 0730)  Patient/family maintains appropriate behavior and adheres to behavioral management agreement, if implemented:   Assess patient/family’s coping skills and  non-compliant behavior (including use of illegal substances)   Notify security of behavior or suspected illegal substances which indicate the need for search of the patient and/or belongings   Encourage verbalization of thoughts and concerns in a socially appropriate manner  Note: Patient is able to maintain appropriate behavior at this point in the shift   4/3/2024 0011 by Lora Shah, RN  Outcome: Not Progressing  Note: Patient is cooperative with assessment and took her medication with no issues but patient isolates accept when she is trying escape and then she acts helpless     Problem: Discharge Planning  Goal: Discharge to home or other facility with appropriate resources  4/3/2024 1126 by Laura Molina LPN  Outcome: Progressing  Flowsheets (Taken 4/3/2024 0730)  Discharge to home or other facility with appropriate resources:   Identify discharge learning needs (meds, wound care, etc)   Identify barriers to discharge with patient and caregiver   Arrange for needed discharge resources and transportation as appropriate  Note: Possible discharge is Friday   4/3/2024 0011 by Lora Shah, RN  Outcome: Not Progressing  Note: Patient states she wants to leave and go ride her motorcycle  Care plan reviewed with patient.  Patient verbalize understanding of the plan of care and contribute to goal setting.

## 2024-04-05 NOTE — PROGRESS NOTES
Bedside shift report was completed with Laura NOVA and hourly rounding was completed. Pt was awake in bed.

## 2024-04-05 NOTE — DISCHARGE SUMMARY
Physician Discharge Summary     Patient ID:  Nyasia Bella  [id number]  58 y.o.  1965    Admit date: 4/1/2024    Discharge date and time: 4/5/2024  8:58 AM     Admitting Physician: Kevin Dunn MD     Discharge Physician: Kevin Dunn MD      Admission Diagnoses: MDD (major depressive disorder), recurrent episode, mild (HCC) [F33.0]  Depression with suicidal ideation [F32.A, R45.851]    IDENTIFYING INFORMATION: ***    HISTORY OF PRESENT ILLNESS: ***    MENTAL STATUS EXAMINATION AT ADMISSION: See H and P.    Discharge Diagnoses:   Severe episode of recurrent major depressive disorder, without psychotic features (HCC)     Past Medical History:   Diagnosis Date    PFO (patent foramen ovale)     congential and closed    PONV (postoperative nausea and vomiting)         Admission Condition: poor    Discharged Condition: stable    Indication for Admission: threat to self    Significant Diagnostic Studies:   See Results Review tab in EHR      TREATMENT AND CLINICAL COURSE:   Patient was admitted on the unit. Routine lab was ordered. Physical examination was within normal limits. At admission, patient was started on ***; Hydroxyzine & Trazodone were added. Patient did not have side effect from medications. Patient was involved in group and milieu therapy. Although patient was suicidal upon admission, patient did not have suicidal thought during this hospital stay. I strongly encouraged patient's sobriety from illicit drugs and alcohol.  I spent some time discussing the effect of illicit drugs & alcohol on patient's mood. We discussed about smoking cessation. Toward the end of the hospital stay, patient become more hopeful. Overall, hospital stay was uncomplicated, and patient was discharged in stable condition.    Consults: none    Treatments: Psychotropic medications, therapy with group, milieu, and 1:1 with nurses, social workers and Attending physician.      Discharge Medications:  Current Discharge Medication

## 2024-04-05 NOTE — BH NOTE
Behavioral Health   Discharge Note    Pt discharged with followings belongings:   Dental Appliances: Retainer (s)  Vision - Corrective Lenses: Eyeglasses  Hearing Aid: None  Jewelry: None  Body Piercings Removed: N/A  Clothing: Shirt, Pants  Other Valuables: Sent home   Valuables retrieved from safe, security envelope number:  n/a and returned to patient.  Patient left department with  via ambulatory.  Discharged to home. \"An Important Message from Medicare About Your Rights\" (IMM) form photocopy original from admission and provided to pt at least 4 hours prior to discharge N/A. \"An Important Message from [employer] About Your Rights\" (IMM) form photocopy original from admission. N/A. If pt left within 4 hours of receiving 2nd delivery of IMM, this is because pt was agreeable with hospital discharge.  Patient/guardian education on aftercare instructions: Yes  Bridge appointment completed:  yes.  Reviewed Discharge Instructions with patient/family/nursing facility.  Patient/family verbalizes understanding and agreement with the discharge plan using the teachback method.   Information faxed to n/a by n/a Patient/family verbalize understanding of AVS:Yes    Status EXAM upon discharge:  Mental Status and Behavioral Exam  Normal: No  Level of Assistance: Independent/Self  Facial Expression: Brightened  Affect: Appropriate  Level of Consciousness: Alert  Frequency of Checks: 4 times per hour, close  Mood:Normal: No  Mood: Anxious  Motor Activity:Normal: No  Motor Activity: Decreased  Eye Contact: Good  Observed Behavior: Cooperative, Friendly  Sexual Misconduct History: Current - no  Preception: [place] to person, [place] to time, [place] to place, [place] to situation  Attention:Normal: No  Attention: Distractible, Unable to concentrate  Thought Processes: Circumstantial  Thought Content:Normal: Yes  Thought Content: Preoccupations  Depression Symptoms: No problems reported or observed.  Anxiety Symptoms:

## 2024-04-29 ENCOUNTER — APPOINTMENT (OUTPATIENT)
Dept: CT IMAGING | Age: 59
DRG: 565 | End: 2024-04-29
Payer: COMMERCIAL

## 2024-04-29 ENCOUNTER — APPOINTMENT (OUTPATIENT)
Dept: GENERAL RADIOLOGY | Age: 59
DRG: 565 | End: 2024-04-29
Payer: COMMERCIAL

## 2024-04-29 ENCOUNTER — HOSPITAL ENCOUNTER (INPATIENT)
Age: 59
LOS: 3 days | Discharge: PSYCHIATRIC OR SUBSTANCE USE FACILITY | DRG: 565 | End: 2024-05-02
Attending: STUDENT IN AN ORGANIZED HEALTH CARE EDUCATION/TRAINING PROGRAM | Admitting: STUDENT IN AN ORGANIZED HEALTH CARE EDUCATION/TRAINING PROGRAM
Payer: COMMERCIAL

## 2024-04-29 DIAGNOSIS — F32.A DEPRESSION, UNSPECIFIED DEPRESSION TYPE: ICD-10-CM

## 2024-04-29 DIAGNOSIS — E87.20 METABOLIC ACIDOSIS: ICD-10-CM

## 2024-04-29 DIAGNOSIS — T14.91XA SUICIDE ATTEMPT (HCC): Primary | ICD-10-CM

## 2024-04-29 PROBLEM — R45.851 SUICIDAL IDEATION: Status: ACTIVE | Noted: 2024-04-29

## 2024-04-29 LAB
ALBUMIN SERPL BCG-MCNC: 5.2 G/DL (ref 3.5–5.1)
ALP SERPL-CCNC: 88 U/L (ref 38–126)
ALT SERPL W/O P-5'-P-CCNC: 22 U/L (ref 11–66)
AMMONIA PLAS-MCNC: 34 UMOL/L (ref 11–60)
AMPHETAMINES UR QL SCN: NEGATIVE
ANION GAP SERPL CALC-SCNC: 23 MEQ/L (ref 8–16)
ANION GAP SERPL CALC-SCNC: 24 MEQ/L (ref 8–16)
ANION GAP SERPL CALC-SCNC: 25 MEQ/L (ref 8–16)
ANION GAP SERPL CALC-SCNC: 26 MEQ/L (ref 8–16)
APAP SERPL-MCNC: < 5 UG/ML (ref 0–20)
APAP SERPL-MCNC: < 5 UG/ML (ref 0–20)
AST SERPL-CCNC: 53 U/L (ref 5–40)
B-OH-BUTYR SERPL-MSCNC: 55.87 MG/DL (ref 0.2–2.81)
BARBITURATES UR QL SCN: NEGATIVE
BASE EXCESS BLDA CALC-SCNC: -13.2 MMOL/L (ref -2–3)
BASOPHILS ABSOLUTE: 0 THOU/MM3 (ref 0–0.1)
BASOPHILS ABSOLUTE: 0 THOU/MM3 (ref 0–0.1)
BASOPHILS NFR BLD AUTO: 0.2 %
BASOPHILS NFR BLD AUTO: 0.2 %
BENZODIAZ UR QL SCN: POSITIVE
BILIRUB CONJ SERPL-MCNC: < 0.2 MG/DL (ref 0–0.3)
BILIRUB SERPL-MCNC: 0.8 MG/DL (ref 0.3–1.2)
BUN SERPL-MCNC: 10 MG/DL (ref 7–22)
BUN SERPL-MCNC: 15 MG/DL (ref 7–22)
BZE UR QL SCN: NEGATIVE
CA-I BLD ISE-SCNC: 1.22 MMOL/L (ref 1.12–1.32)
CALCIUM SERPL-MCNC: 10.3 MG/DL (ref 8.5–10.5)
CALCIUM SERPL-MCNC: 8.7 MG/DL (ref 8.5–10.5)
CALCIUM SERPL-MCNC: 9.4 MG/DL (ref 8.5–10.5)
CALCIUM SERPL-MCNC: 9.5 MG/DL (ref 8.5–10.5)
CANNABINOIDS UR QL SCN: NEGATIVE
CHLORIDE 24H UR-SRATE: 102 MEQ/L
CHLORIDE SERPL-SCNC: 103 MEQ/L (ref 98–111)
CHLORIDE SERPL-SCNC: 107 MEQ/L (ref 98–111)
CHLORIDE SERPL-SCNC: 108 MEQ/L (ref 98–111)
CHLORIDE SERPL-SCNC: 108 MEQ/L (ref 98–111)
CO2 SERPL-SCNC: 10 MEQ/L (ref 23–33)
CO2 SERPL-SCNC: 13 MEQ/L (ref 23–33)
COLLECTED BY:: ABNORMAL
CREAT SERPL-MCNC: 0.6 MG/DL (ref 0.4–1.2)
CREAT SERPL-MCNC: 0.6 MG/DL (ref 0.4–1.2)
CREAT SERPL-MCNC: 0.7 MG/DL (ref 0.4–1.2)
CREAT SERPL-MCNC: 0.7 MG/DL (ref 0.4–1.2)
CRP SERPL-MCNC: 4.71 MG/DL (ref 0–1)
DEPRECATED RDW RBC AUTO: 42.5 FL (ref 35–45)
DEPRECATED RDW RBC AUTO: 43.6 FL (ref 35–45)
EOSINOPHIL NFR BLD AUTO: 0 %
EOSINOPHIL NFR BLD AUTO: 0 %
EOSINOPHILS ABSOLUTE: 0 THOU/MM3 (ref 0–0.4)
EOSINOPHILS ABSOLUTE: 0 THOU/MM3 (ref 0–0.4)
ERYTHROCYTE [DISTWIDTH] IN BLOOD BY AUTOMATED COUNT: 12.5 % (ref 11.5–14.5)
ERYTHROCYTE [DISTWIDTH] IN BLOOD BY AUTOMATED COUNT: 12.6 % (ref 11.5–14.5)
ETHANOL SERPL-MCNC: < 0.01 %
FENTANYL: NEGATIVE
GFR SERPL CREATININE-BSD FRML MDRD: > 90 ML/MIN/1.73M2
GLUCOSE SERPL-MCNC: 112 MG/DL (ref 70–108)
GLUCOSE SERPL-MCNC: 85 MG/DL (ref 70–108)
GLUCOSE SERPL-MCNC: 87 MG/DL (ref 70–108)
GLUCOSE SERPL-MCNC: 97 MG/DL (ref 70–108)
HCO3 BLDA-SCNC: 12 MMOL/L (ref 23–28)
HCT VFR BLD AUTO: 47 % (ref 37–47)
HCT VFR BLD AUTO: 51.1 % (ref 37–47)
HGB BLD-MCNC: 15.9 GM/DL (ref 12–16)
HGB BLD-MCNC: 17.2 GM/DL (ref 12–16)
IMM GRANULOCYTES # BLD AUTO: 0.1 THOU/MM3 (ref 0–0.07)
IMM GRANULOCYTES # BLD AUTO: 0.17 THOU/MM3 (ref 0–0.07)
IMM GRANULOCYTES NFR BLD AUTO: 0.5 %
IMM GRANULOCYTES NFR BLD AUTO: 0.9 %
LACTATE SERPL-SCNC: 1.2 MMOL/L (ref 0.5–2)
LACTATE SERPL-SCNC: 2.2 MMOL/L (ref 0.5–2)
LYMPHOCYTES ABSOLUTE: 0.7 THOU/MM3 (ref 1–4.8)
LYMPHOCYTES ABSOLUTE: 0.8 THOU/MM3 (ref 1–4.8)
LYMPHOCYTES NFR BLD AUTO: 3.7 %
LYMPHOCYTES NFR BLD AUTO: 4 %
MAGNESIUM SERPL-MCNC: 1.8 MG/DL (ref 1.6–2.4)
MCH RBC QN AUTO: 31.5 PG (ref 26–33)
MCH RBC QN AUTO: 32 PG (ref 26–33)
MCHC RBC AUTO-ENTMCNC: 33.7 GM/DL (ref 32.2–35.5)
MCHC RBC AUTO-ENTMCNC: 33.8 GM/DL (ref 32.2–35.5)
MCV RBC AUTO: 93.6 FL (ref 81–99)
MCV RBC AUTO: 94.6 FL (ref 81–99)
MONOCYTES ABSOLUTE: 1 THOU/MM3 (ref 0.4–1.3)
MONOCYTES ABSOLUTE: 1.3 THOU/MM3 (ref 0.4–1.3)
MONOCYTES NFR BLD AUTO: 5.4 %
MONOCYTES NFR BLD AUTO: 6.3 %
NEUTROPHILS NFR BLD AUTO: 88.9 %
NEUTROPHILS NFR BLD AUTO: 89.9 %
NRBC BLD AUTO-RTO: 0 /100 WBC
NRBC BLD AUTO-RTO: 0 /100 WBC
OPIATES UR QL SCN: NEGATIVE
OSMOLALITY SERPL CALC.SUM OF ELEC: 279.6 MOSMOL/KG (ref 275–300)
OSMOLALITY SERPL CALC.SUM OF ELEC: 280.2 MOSMOL/KG (ref 275–300)
OSMOLALITY SERPL CALC.SUM OF ELEC: 283.4 MOSMOL/KG (ref 275–300)
OSMOLALITY SERPL CALC.SUM OF ELEC: 284.7 MOSMOL/KG (ref 275–300)
OSMOLALITY SERPL: 298 MOSMOL/KG (ref 275–295)
OSMOLALITY UR: 675 MOSMOL/KG (ref 250–750)
OXYCODONE: NEGATIVE
PCO2 TEMP ADJ BLDMV: 26 MMHG (ref 41–51)
PCP UR QL SCN: NEGATIVE
PH BLDMV: 7.27 [PH] (ref 7.31–7.41)
PH BLDV: 7.19 [PH] (ref 7.31–7.41)
PLATELET # BLD AUTO: 235 THOU/MM3 (ref 130–400)
PLATELET # BLD AUTO: 260 THOU/MM3 (ref 130–400)
PMV BLD AUTO: 10 FL (ref 9.4–12.4)
PMV BLD AUTO: 10.1 FL (ref 9.4–12.4)
PO2 BLDMV: 46 MMHG (ref 25–40)
POTASSIUM SERPL-SCNC: 3.8 MEQ/L (ref 3.5–5.2)
POTASSIUM SERPL-SCNC: 4 MEQ/L (ref 3.5–5.2)
POTASSIUM SERPL-SCNC: 4.1 MEQ/L (ref 3.5–5.2)
POTASSIUM SERPL-SCNC: 4.2 MEQ/L (ref 3.5–5.2)
POTASSIUM UR-SCNC: 39.8 MEQ/L
PROCALCITONIN SERPL IA-MCNC: 0.07 NG/ML (ref 0.01–0.09)
PROT SERPL-MCNC: 8.4 G/DL (ref 6.1–8)
RBC # BLD AUTO: 4.97 MILL/MM3 (ref 4.2–5.4)
RBC # BLD AUTO: 5.46 MILL/MM3 (ref 4.2–5.4)
SALICYLATES SERPL-MCNC: < 0.3 MG/DL (ref 2–10)
SAO2 % BLDMV: 77 %
SEGMENTED NEUTROPHILS ABSOLUTE COUNT: 17.4 THOU/MM3 (ref 1.8–7.7)
SEGMENTED NEUTROPHILS ABSOLUTE COUNT: 17.8 THOU/MM3 (ref 1.8–7.7)
SODIUM SERPL-SCNC: 141 MEQ/L (ref 135–145)
SODIUM SERPL-SCNC: 141 MEQ/L (ref 135–145)
SODIUM SERPL-SCNC: 142 MEQ/L (ref 135–145)
SODIUM SERPL-SCNC: 143 MEQ/L (ref 135–145)
SODIUM UR-SCNC: 174 MEQ/L
TSH SERPL DL<=0.005 MIU/L-ACNC: 1.22 UIU/ML (ref 0.4–4.2)
WBC # BLD AUTO: 19.4 THOU/MM3 (ref 4.8–10.8)
WBC # BLD AUTO: 20 THOU/MM3 (ref 4.8–10.8)

## 2024-04-29 PROCEDURE — 82010 KETONE BODYS QUAN: CPT

## 2024-04-29 PROCEDURE — 2060000000 HC ICU INTERMEDIATE R&B

## 2024-04-29 PROCEDURE — 83930 ASSAY OF BLOOD OSMOLALITY: CPT

## 2024-04-29 PROCEDURE — 80307 DRUG TEST PRSMV CHEM ANLYZR: CPT

## 2024-04-29 PROCEDURE — 85014 HEMATOCRIT: CPT

## 2024-04-29 PROCEDURE — 6360000002 HC RX W HCPCS: Performed by: PHYSICIAN ASSISTANT

## 2024-04-29 PROCEDURE — 71045 X-RAY EXAM CHEST 1 VIEW: CPT

## 2024-04-29 PROCEDURE — 86140 C-REACTIVE PROTEIN: CPT

## 2024-04-29 PROCEDURE — 82140 ASSAY OF AMMONIA: CPT

## 2024-04-29 PROCEDURE — 85025 COMPLETE CBC W/AUTO DIFF WBC: CPT

## 2024-04-29 PROCEDURE — 87040 BLOOD CULTURE FOR BACTERIA: CPT

## 2024-04-29 PROCEDURE — 99285 EMERGENCY DEPT VISIT HI MDM: CPT

## 2024-04-29 PROCEDURE — 82077 ASSAY SPEC XCP UR&BREATH IA: CPT

## 2024-04-29 PROCEDURE — 74176 CT ABD & PELVIS W/O CONTRAST: CPT

## 2024-04-29 PROCEDURE — 82248 BILIRUBIN DIRECT: CPT

## 2024-04-29 PROCEDURE — 85018 HEMOGLOBIN: CPT

## 2024-04-29 PROCEDURE — 83605 ASSAY OF LACTIC ACID: CPT

## 2024-04-29 PROCEDURE — 84300 ASSAY OF URINE SODIUM: CPT

## 2024-04-29 PROCEDURE — 96365 THER/PROPH/DIAG IV INF INIT: CPT

## 2024-04-29 PROCEDURE — 82330 ASSAY OF CALCIUM: CPT

## 2024-04-29 PROCEDURE — 6360000002 HC RX W HCPCS

## 2024-04-29 PROCEDURE — 83935 ASSAY OF URINE OSMOLALITY: CPT

## 2024-04-29 PROCEDURE — 82436 ASSAY OF URINE CHLORIDE: CPT

## 2024-04-29 PROCEDURE — 2500000003 HC RX 250 WO HCPCS: Performed by: NURSE PRACTITIONER

## 2024-04-29 PROCEDURE — 82803 BLOOD GASES ANY COMBINATION: CPT

## 2024-04-29 PROCEDURE — 82800 BLOOD PH: CPT

## 2024-04-29 PROCEDURE — 93005 ELECTROCARDIOGRAM TRACING: CPT | Performed by: PHYSICIAN ASSISTANT

## 2024-04-29 PROCEDURE — 70490 CT SOFT TISSUE NECK W/O DYE: CPT

## 2024-04-29 PROCEDURE — 84133 ASSAY OF URINE POTASSIUM: CPT

## 2024-04-29 PROCEDURE — 84145 PROCALCITONIN (PCT): CPT

## 2024-04-29 PROCEDURE — 80053 COMPREHEN METABOLIC PANEL: CPT

## 2024-04-29 PROCEDURE — 36415 COLL VENOUS BLD VENIPUNCTURE: CPT

## 2024-04-29 PROCEDURE — 6370000000 HC RX 637 (ALT 250 FOR IP): Performed by: NURSE PRACTITIONER

## 2024-04-29 PROCEDURE — 99223 1ST HOSP IP/OBS HIGH 75: CPT | Performed by: NURSE PRACTITIONER

## 2024-04-29 PROCEDURE — 80179 DRUG ASSAY SALICYLATE: CPT

## 2024-04-29 PROCEDURE — 70450 CT HEAD/BRAIN W/O DYE: CPT

## 2024-04-29 PROCEDURE — 83735 ASSAY OF MAGNESIUM: CPT

## 2024-04-29 PROCEDURE — 80143 DRUG ASSAY ACETAMINOPHEN: CPT

## 2024-04-29 PROCEDURE — 2580000003 HC RX 258: Performed by: PHYSICIAN ASSISTANT

## 2024-04-29 PROCEDURE — 84443 ASSAY THYROID STIM HORMONE: CPT

## 2024-04-29 PROCEDURE — 2580000003 HC RX 258: Performed by: NURSE PRACTITIONER

## 2024-04-29 RX ORDER — POLYETHYLENE GLYCOL 3350 17 G/17G
17 POWDER, FOR SOLUTION ORAL DAILY PRN
Status: DISCONTINUED | OUTPATIENT
Start: 2024-04-29 | End: 2024-05-02 | Stop reason: HOSPADM

## 2024-04-29 RX ORDER — POTASSIUM CHLORIDE 20 MEQ/1
40 TABLET, EXTENDED RELEASE ORAL PRN
Status: DISCONTINUED | OUTPATIENT
Start: 2024-04-29 | End: 2024-05-02 | Stop reason: HOSPADM

## 2024-04-29 RX ORDER — DIPHENHYDRAMINE HYDROCHLORIDE 50 MG/ML
25 INJECTION INTRAMUSCULAR; INTRAVENOUS EVERY 6 HOURS PRN
Status: DISCONTINUED | OUTPATIENT
Start: 2024-04-29 | End: 2024-05-02 | Stop reason: HOSPADM

## 2024-04-29 RX ORDER — ONDANSETRON 4 MG/1
4 TABLET, ORALLY DISINTEGRATING ORAL EVERY 8 HOURS PRN
Status: DISCONTINUED | OUTPATIENT
Start: 2024-04-29 | End: 2024-05-02 | Stop reason: HOSPADM

## 2024-04-29 RX ORDER — 0.9 % SODIUM CHLORIDE 0.9 %
1000 INTRAVENOUS SOLUTION INTRAVENOUS ONCE
Status: COMPLETED | OUTPATIENT
Start: 2024-04-29 | End: 2024-04-29

## 2024-04-29 RX ORDER — LORAZEPAM 2 MG/ML
INJECTION INTRAMUSCULAR
Status: COMPLETED
Start: 2024-04-29 | End: 2024-04-29

## 2024-04-29 RX ORDER — ONDANSETRON 2 MG/ML
4 INJECTION INTRAMUSCULAR; INTRAVENOUS EVERY 6 HOURS PRN
Status: DISCONTINUED | OUTPATIENT
Start: 2024-04-29 | End: 2024-05-02 | Stop reason: HOSPADM

## 2024-04-29 RX ORDER — SODIUM CHLORIDE 9 MG/ML
INJECTION, SOLUTION INTRAVENOUS PRN
Status: DISCONTINUED | OUTPATIENT
Start: 2024-04-29 | End: 2024-05-02 | Stop reason: HOSPADM

## 2024-04-29 RX ORDER — ZOLPIDEM TARTRATE 5 MG/1
10 TABLET ORAL NIGHTLY PRN
Status: DISCONTINUED | OUTPATIENT
Start: 2024-04-29 | End: 2024-05-02 | Stop reason: HOSPADM

## 2024-04-29 RX ORDER — SODIUM CHLORIDE 0.9 % (FLUSH) 0.9 %
5-40 SYRINGE (ML) INJECTION PRN
Status: DISCONTINUED | OUTPATIENT
Start: 2024-04-29 | End: 2024-05-02 | Stop reason: HOSPADM

## 2024-04-29 RX ORDER — ACETAMINOPHEN 650 MG/1
650 SUPPOSITORY RECTAL EVERY 6 HOURS PRN
Status: DISCONTINUED | OUTPATIENT
Start: 2024-04-29 | End: 2024-05-02 | Stop reason: HOSPADM

## 2024-04-29 RX ORDER — DIPHENHYDRAMINE HYDROCHLORIDE 50 MG/ML
25 INJECTION INTRAMUSCULAR; INTRAVENOUS ONCE
Status: COMPLETED | OUTPATIENT
Start: 2024-04-29 | End: 2024-04-29

## 2024-04-29 RX ORDER — MAGNESIUM SULFATE IN WATER 40 MG/ML
2000 INJECTION, SOLUTION INTRAVENOUS PRN
Status: DISCONTINUED | OUTPATIENT
Start: 2024-04-29 | End: 2024-05-02 | Stop reason: HOSPADM

## 2024-04-29 RX ORDER — SODIUM CHLORIDE 0.9 % (FLUSH) 0.9 %
5-40 SYRINGE (ML) INJECTION EVERY 12 HOURS SCHEDULED
Status: DISCONTINUED | OUTPATIENT
Start: 2024-04-29 | End: 2024-05-02 | Stop reason: HOSPADM

## 2024-04-29 RX ORDER — ACETAMINOPHEN 325 MG/1
650 TABLET ORAL EVERY 6 HOURS PRN
Status: DISCONTINUED | OUTPATIENT
Start: 2024-04-29 | End: 2024-05-02 | Stop reason: HOSPADM

## 2024-04-29 RX ORDER — POTASSIUM CHLORIDE 7.45 MG/ML
10 INJECTION INTRAVENOUS PRN
Status: DISCONTINUED | OUTPATIENT
Start: 2024-04-29 | End: 2024-05-02 | Stop reason: HOSPADM

## 2024-04-29 RX ORDER — LORAZEPAM 2 MG/ML
1 INJECTION INTRAMUSCULAR ONCE
Status: COMPLETED | OUTPATIENT
Start: 2024-04-29 | End: 2024-04-29

## 2024-04-29 RX ORDER — HALOPERIDOL 5 MG/ML
5 INJECTION INTRAMUSCULAR ONCE
Status: COMPLETED | OUTPATIENT
Start: 2024-04-29 | End: 2024-04-29

## 2024-04-29 RX ORDER — ENOXAPARIN SODIUM 100 MG/ML
30 INJECTION SUBCUTANEOUS DAILY
Status: DISCONTINUED | OUTPATIENT
Start: 2024-04-30 | End: 2024-05-02 | Stop reason: HOSPADM

## 2024-04-29 RX ADMIN — DIPHENHYDRAMINE HYDROCHLORIDE 25 MG: 50 INJECTION, SOLUTION INTRAMUSCULAR; INTRAVENOUS at 11:34

## 2024-04-29 RX ADMIN — LORAZEPAM 1 MG: 2 INJECTION, SOLUTION INTRAMUSCULAR; INTRAVENOUS at 11:33

## 2024-04-29 RX ADMIN — SODIUM CHLORIDE 1000 ML: 9 INJECTION, SOLUTION INTRAVENOUS at 14:43

## 2024-04-29 RX ADMIN — SODIUM BICARBONATE: 84 INJECTION, SOLUTION INTRAVENOUS at 23:32

## 2024-04-29 RX ADMIN — SODIUM CHLORIDE 1000 ML: 9 INJECTION, SOLUTION INTRAVENOUS at 13:23

## 2024-04-29 RX ADMIN — FOMEPIZOLE 710 MG: 1 INJECTION, SOLUTION INTRAVENOUS at 19:45

## 2024-04-29 RX ADMIN — LORAZEPAM 1 MG: 2 INJECTION INTRAMUSCULAR at 11:33

## 2024-04-29 RX ADMIN — ZOLPIDEM TARTRATE 10 MG: 5 TABLET ORAL at 23:38

## 2024-04-29 RX ADMIN — HALOPERIDOL LACTATE 5 MG: 5 INJECTION, SOLUTION INTRAMUSCULAR at 11:33

## 2024-04-29 ASSESSMENT — PATIENT HEALTH QUESTIONNAIRE - PHQ9
7. TROUBLE CONCENTRATING ON THINGS, SUCH AS READING THE NEWSPAPER OR WATCHING TELEVISION: NEARLY EVERY DAY
SUM OF ALL RESPONSES TO PHQ QUESTIONS 1-9: 27
10. IF YOU CHECKED OFF ANY PROBLEMS, HOW DIFFICULT HAVE THESE PROBLEMS MADE IT FOR YOU TO DO YOUR WORK, TAKE CARE OF THINGS AT HOME, OR GET ALONG WITH OTHER PEOPLE: EXTREMELY DIFFICULT
2. FEELING DOWN, DEPRESSED OR HOPELESS: NEARLY EVERY DAY
1. LITTLE INTEREST OR PLEASURE IN DOING THINGS: NEARLY EVERY DAY
9. THOUGHTS THAT YOU WOULD BE BETTER OFF DEAD, OR OF HURTING YOURSELF: NEARLY EVERY DAY
8. MOVING OR SPEAKING SO SLOWLY THAT OTHER PEOPLE COULD HAVE NOTICED. OR THE OPPOSITE, BEING SO FIGETY OR RESTLESS THAT YOU HAVE BEEN MOVING AROUND A LOT MORE THAN USUAL: NEARLY EVERY DAY
3. TROUBLE FALLING OR STAYING ASLEEP: NEARLY EVERY DAY
SUM OF ALL RESPONSES TO PHQ QUESTIONS 1-9: 27
SUM OF ALL RESPONSES TO PHQ9 QUESTIONS 1 & 2: 6
4. FEELING TIRED OR HAVING LITTLE ENERGY: NEARLY EVERY DAY
SUM OF ALL RESPONSES TO PHQ QUESTIONS 1-9: 27
SUM OF ALL RESPONSES TO PHQ QUESTIONS 1-9: 24
5. POOR APPETITE OR OVEREATING: NEARLY EVERY DAY
6. FEELING BAD ABOUT YOURSELF - OR THAT YOU ARE A FAILURE OR HAVE LET YOURSELF OR YOUR FAMILY DOWN: NEARLY EVERY DAY

## 2024-04-29 ASSESSMENT — SLEEP AND FATIGUE QUESTIONNAIRES
DO YOU USE A SLEEP AID: NO
DO YOU HAVE DIFFICULTY SLEEPING: NO
AVERAGE NUMBER OF SLEEP HOURS: 8

## 2024-04-29 NOTE — ED NOTES
Patient resting in bed. Respirations easy and unlabored. No distress noted.   Sitter at bedside. Family at bedside.

## 2024-04-29 NOTE — ED NOTES
RN spoke with BAILEE ANDERSEN about concern for pt safety due to potentially strangling herself. Verbal order.

## 2024-04-29 NOTE — ED PROVIDER NOTES
[facility] EMERGENCY DEPT      Pt Name: Nyasia Bella  MRN: [id number]  Birthdate 1965  Date of evaluation: 4/29/2024  Provider: Consuelo Lamas PA-C    CHIEF COMPLAINT       Chief Complaint   Patient presents with    Suicidal       Nurses Notes reviewed and I agree except as noted in the HPI.      HISTORY OF PRESENT ILLNESS    Nyasia Bella is a 58 y.o. female who presents from [facility] by [facility] for suicidal behavior.  Patient does not want to answer questions.  She does shake her head yes or no.  Patient affirms she knows where she is.  Patient denies that she is injured or in pain.  Patient denies illness.  Per Jemal BROWN triage note reads:      Patient transferred to [facility] ER from [facility] for suicidal attempt. Patient was trying to strangle herself with anything she can get her hands on. On arrival patient completely naked. Patient resisting to put cloths on. Patient will not answer questions and is not cooperative. Patient is not violent at this time. Sitter at bedside along with security. Patient was able to be placed in scrubs reluctantly.     Bedside sitter admits patient tried to strangle herself here and pillow was removed for her safety.    Family much later at bedside reported patient had mentally decompensated over the course of 6 months after losing a job she had for over 30 years.  She had an admission over Easter weekend and seemed to improve over the course of 2 weeks but then declined.  Her mental state worsened this past week after her sister left after a visit and her spouse was leaving to go fishing.  Daughter had found her with a pillowcase over her head and she had ingested 8 Xanax and 8 Ambien from a prescription from 2019 she found an old purse.  She was taken to [facility] and ultimately was medically cleared.  She was kept overnight in the ER until [facility]  evaluated her in the morning.  She was then sent to the [facility].  Spouse and daughter had no idea she had

## 2024-04-29 NOTE — ED NOTES
Pt refused to get up from toilet. RN had to lift pt. RN found urine cup in vagina and had to remove.

## 2024-04-29 NOTE — ED NOTES
Patient resting in bed. Respirations easy and unlabored. No distress noted. Sitter and family at bedside.

## 2024-04-29 NOTE — ED NOTES
Rn at bedside to assess and found pt with blanket wrapped around her head. Pt refused take off the blanket. RN had to pull back blanket and noticed pt was biting blanket. Pt refused to let go and kept biting. Staff had to pull blanket. [place] removed for pt safety. Pt has safety sitter at bedside.

## 2024-04-29 NOTE — ED NOTES
Report received from KEVIN Guardado. Patient resting in bed with eyes closed. Respirations easy and unlabored. No distress noted. Call light within reach. Sitter at the bedside.

## 2024-04-29 NOTE — ED NOTES
Patient transferred to [facility] from [facility] for suicidal attempt. Patient was trying to strangle herself with anything she can get her hands on. On arrival patient completely naked. Patient resisting to put cloths on. Patient will not answer questions and is not cooperative. Patient is not violent at this time. Sitter at bedside along with security. Patient was able to be placed in scrubs reluctantly.

## 2024-04-30 ENCOUNTER — APPOINTMENT (OUTPATIENT)
Dept: ULTRASOUND IMAGING | Age: 59
DRG: 565 | End: 2024-04-30
Payer: COMMERCIAL

## 2024-04-30 ENCOUNTER — APPOINTMENT (OUTPATIENT)
Age: 59
DRG: 565 | End: 2024-04-30
Attending: NURSE PRACTITIONER
Payer: COMMERCIAL

## 2024-04-30 PROBLEM — E07.9 THYROID LESION: Status: ACTIVE | Noted: 2024-04-30

## 2024-04-30 PROBLEM — T14.91XA SUICIDE ATTEMPT (HCC): Status: ACTIVE | Noted: 2024-04-30

## 2024-04-30 PROBLEM — T79.6XXA TRAUMATIC RHABDOMYOLYSIS (HCC): Status: ACTIVE | Noted: 2024-04-30

## 2024-04-30 PROBLEM — E87.29 HIGH ANION GAP METABOLIC ACIDOSIS: Status: ACTIVE | Noted: 2024-04-30

## 2024-04-30 PROBLEM — T71.164A HANGING: Status: ACTIVE | Noted: 2024-04-30

## 2024-04-30 LAB
ALBUMIN SERPL BCG-MCNC: 3.8 G/DL (ref 3.5–5.1)
ALP SERPL-CCNC: 56 U/L (ref 38–126)
ALT SERPL W/O P-5'-P-CCNC: 24 U/L (ref 11–66)
ANION GAP SERPL CALC-SCNC: 18 MEQ/L (ref 8–16)
ANION GAP SERPL CALC-SCNC: 18 MEQ/L (ref 8–16)
ARTERIAL PATENCY WRIST A: POSITIVE
ARTERIAL PATENCY WRIST A: POSITIVE
AST SERPL-CCNC: 65 U/L (ref 5–40)
BASE EXCESS BLDA CALC-SCNC: -0.5 MMOL/L (ref -2.5–2.5)
BASE EXCESS BLDA CALC-SCNC: -13.5 MMOL/L (ref -2.5–2.5)
BDY SITE: ABNORMAL
BDY SITE: ABNORMAL
BILIRUB SERPL-MCNC: 1.4 MG/DL (ref 0.3–1.2)
BUN SERPL-MCNC: 8 MG/DL (ref 7–22)
BUN SERPL-MCNC: 9 MG/DL (ref 7–22)
CALCIUM SERPL-MCNC: 8.9 MG/DL (ref 8.5–10.5)
CALCIUM SERPL-MCNC: 9 MG/DL (ref 8.5–10.5)
CHLORIDE SERPL-SCNC: 103 MEQ/L (ref 98–111)
CHLORIDE SERPL-SCNC: 104 MEQ/L (ref 98–111)
CK SERPL-CCNC: 1400 U/L (ref 30–135)
CK SERPL-CCNC: 1550 U/L (ref 30–135)
CK SERPL-CCNC: 2200 U/L (ref 30–135)
CO2 SERPL-SCNC: 20 MEQ/L (ref 23–33)
CO2 SERPL-SCNC: 21 MEQ/L (ref 23–33)
COLLECTED BY:: ABNORMAL
COLLECTED BY:: ABNORMAL
CORTIS SERPL-MCNC: 21.49 UG/DL
CORTISOL COLLECTION INFO: NORMAL
CREAT SERPL-MCNC: 0.6 MG/DL (ref 0.4–1.2)
CREAT SERPL-MCNC: 0.7 MG/DL (ref 0.4–1.2)
DEVICE: ABNORMAL
DEVICE: ABNORMAL
ECHO AO ASC DIAM: 3.3 CM
ECHO AO ASCENDING AORTA INDEX: 2.28 CM/M2
ECHO AO SINUS VALSALVA DIAM: 2.7 CM
ECHO AO SINUS VALSALVA INDEX: 1.86 CM/M2
ECHO AO ST JNCT DIAM: 2.3 CM
ECHO AV CUSP MM: 1.4 CM
ECHO AV MEAN GRADIENT: 8 MMHG
ECHO AV MEAN VELOCITY: 1.4 M/S
ECHO AV PEAK GRADIENT: 14 MMHG
ECHO AV PEAK VELOCITY: 1.9 M/S
ECHO AV VELOCITY RATIO: 0.53
ECHO AV VTI: 35.9 CM
ECHO BSA: 1.46 M2
ECHO EST RA PRESSURE: 10 MMHG
ECHO LA AREA 2C: 11.5 CM2
ECHO LA AREA 4C: 12.7 CM2
ECHO LA DIAMETER INDEX: 1.86 CM/M2
ECHO LA DIAMETER: 2.7 CM
ECHO LA MAJOR AXIS: 4.8 CM
ECHO LA MINOR AXIS: 4.6 CM
ECHO LA VOL BP: 26 ML (ref 22–52)
ECHO LA VOL MOD A2C: 24 ML (ref 22–52)
ECHO LA VOL MOD A4C: 27 ML (ref 22–52)
ECHO LA VOL/BSA BIPLANE: 18 ML/M2 (ref 16–34)
ECHO LA VOLUME INDEX MOD A2C: 17 ML/M2 (ref 16–34)
ECHO LA VOLUME INDEX MOD A4C: 19 ML/M2 (ref 16–34)
ECHO LV E' LATERAL VELOCITY: 12 CM/S
ECHO LV E' SEPTAL VELOCITY: 11 CM/S
ECHO LV FRACTIONAL SHORTENING: 33 % (ref 28–44)
ECHO LV INTERNAL DIMENSION DIASTOLE INDEX: 2.76 CM/M2
ECHO LV INTERNAL DIMENSION DIASTOLIC: 4 CM (ref 3.9–5.3)
ECHO LV INTERNAL DIMENSION SYSTOLIC INDEX: 1.86 CM/M2
ECHO LV INTERNAL DIMENSION SYSTOLIC: 2.7 CM
ECHO LV ISOVOLUMETRIC RELAXATION TIME (IVRT): 71 MS
ECHO LV IVSD: 1 CM (ref 0.6–0.9)
ECHO LV MASS 2D: 127.1 G (ref 67–162)
ECHO LV MASS INDEX 2D: 87.6 G/M2 (ref 43–95)
ECHO LV POSTERIOR WALL DIASTOLIC: 1 CM (ref 0.6–0.9)
ECHO LV RELATIVE WALL THICKNESS RATIO: 0.5
ECHO LVOT AV VTI INDEX: 0.53
ECHO LVOT MEAN GRADIENT: 2 MMHG
ECHO LVOT PEAK GRADIENT: 4 MMHG
ECHO LVOT PEAK VELOCITY: 1 M/S
ECHO LVOT VTI: 19.1 CM
ECHO MV A VELOCITY: 1.04 M/S
ECHO MV E DECELERATION TIME (DT): 214 MS
ECHO MV E VELOCITY: 0.79 M/S
ECHO MV E/A RATIO: 0.76
ECHO MV E/E' LATERAL: 6.58
ECHO MV E/E' RATIO (AVERAGED): 6.88
ECHO MV REGURGITANT PEAK GRADIENT: 135 MMHG
ECHO MV REGURGITANT PEAK VELOCITY: 5.8 M/S
ECHO PV MAX VELOCITY: 0.8 M/S
ECHO PV PEAK GRADIENT: 2 MMHG
ECHO RIGHT VENTRICULAR SYSTOLIC PRESSURE (RVSP): 39 MMHG
ECHO RV INTERNAL DIMENSION: 2.4 CM
ECHO RV TAPSE: 1.8 CM (ref 1.7–?)
ECHO TV E WAVE: 0.7 M/S
ECHO TV REGURGITANT MAX VELOCITY: 2.69 M/S
ECHO TV REGURGITANT PEAK GRADIENT: 29 MMHG
EKG ATRIAL RATE: 109 BPM
EKG P AXIS: 61 DEGREES
EKG P-R INTERVAL: 126 MS
EKG Q-T INTERVAL: 364 MS
EKG QRS DURATION: 72 MS
EKG QTC CALCULATION (BAZETT): 490 MS
EKG R AXIS: 68 DEGREES
EKG T AXIS: 65 DEGREES
EKG VENTRICULAR RATE: 109 BPM
FIO2 ON VENT O2 ANALYZER: 21 %
FIO2 ON VENT O2 ANALYZER: 21 %
FLUAV RNA RESP QL NAA+PROBE: NOT DETECTED
FLUBV RNA RESP QL NAA+PROBE: NOT DETECTED
GFR SERPL CREATININE-BSD FRML MDRD: > 90 ML/MIN/1.73M2
GFR SERPL CREATININE-BSD FRML MDRD: > 90 ML/MIN/1.73M2
GLUCOSE SERPL-MCNC: 128 MG/DL (ref 70–108)
GLUCOSE SERPL-MCNC: 130 MG/DL (ref 70–108)
HAV IGM SER QL: NEGATIVE
HBV CORE IGM SERPL QL IA: NEGATIVE
HBV SURFACE AG SERPL QL IA: NEGATIVE
HCO3 BLDA-SCNC: 11 MMOL/L (ref 23–28)
HCO3 BLDA-SCNC: 23 MMOL/L (ref 23–28)
HCT VFR BLD AUTO: 48.6 % (ref 37–47)
HCV IGG SERPL QL IA: NEGATIVE
HGB BLD-MCNC: 16.2 GM/DL (ref 12–16)
MRSA DNA SPEC QL NAA+PROBE: NEGATIVE
OSMOLALITY SERPL CALC.SUM OF ELEC: 283.4 MOSMOL/KG (ref 275–300)
PCO2 BLDA: 24 MMHG (ref 35–45)
PCO2 BLDA: 32 MMHG (ref 35–45)
PH BLDA: 7.28 [PH] (ref 7.35–7.45)
PH BLDA: 7.46 [PH] (ref 7.35–7.45)
PH BLDV: 7.41 [PH] (ref 7.31–7.41)
PO2 BLDA: 77 MMHG (ref 71–104)
PO2 BLDA: 88 MMHG (ref 71–104)
POTASSIUM SERPL-SCNC: 3.2 MEQ/L (ref 3.5–5.2)
POTASSIUM SERPL-SCNC: 3.5 MEQ/L (ref 3.5–5.2)
PROT SERPL-MCNC: 6.2 G/DL (ref 6.1–8)
RHEUMATOID FACT SERPL-ACNC: 13 IU/ML (ref 0–13)
SAO2 % BLDA: 96 %
SAO2 % BLDA: 96 %
SARS-COV-2 RNA RESP QL NAA+PROBE: NOT DETECTED
SODIUM SERPL-SCNC: 142 MEQ/L (ref 135–145)
SODIUM SERPL-SCNC: 142 MEQ/L (ref 135–145)

## 2024-04-30 PROCEDURE — 2580000003 HC RX 258: Performed by: NURSE PRACTITIONER

## 2024-04-30 PROCEDURE — 87636 SARSCOV2 & INF A&B AMP PRB: CPT

## 2024-04-30 PROCEDURE — A4216 STERILE WATER/SALINE, 10 ML: HCPCS | Performed by: NURSE PRACTITIONER

## 2024-04-30 PROCEDURE — 82800 BLOOD PH: CPT

## 2024-04-30 PROCEDURE — 94761 N-INVAS EAR/PLS OXIMETRY MLT: CPT

## 2024-04-30 PROCEDURE — 80074 ACUTE HEPATITIS PANEL: CPT

## 2024-04-30 PROCEDURE — 2060000000 HC ICU INTERMEDIATE R&B

## 2024-04-30 PROCEDURE — 93010 ELECTROCARDIOGRAM REPORT: CPT | Performed by: INTERNAL MEDICINE

## 2024-04-30 PROCEDURE — 6370000000 HC RX 637 (ALT 250 FOR IP): Performed by: NURSE PRACTITIONER

## 2024-04-30 PROCEDURE — 82803 BLOOD GASES ANY COMBINATION: CPT

## 2024-04-30 PROCEDURE — 93306 TTE W/DOPPLER COMPLETE: CPT | Performed by: NUCLEAR MEDICINE

## 2024-04-30 PROCEDURE — 76536 US EXAM OF HEAD AND NECK: CPT

## 2024-04-30 PROCEDURE — 82533 TOTAL CORTISOL: CPT

## 2024-04-30 PROCEDURE — 37799 UNLISTED PX VASCULAR SURGERY: CPT

## 2024-04-30 PROCEDURE — 99232 SBSQ HOSP IP/OBS MODERATE 35: CPT | Performed by: NURSE PRACTITIONER

## 2024-04-30 PROCEDURE — 82550 ASSAY OF CK (CPK): CPT

## 2024-04-30 PROCEDURE — 86430 RHEUMATOID FACTOR TEST QUAL: CPT

## 2024-04-30 PROCEDURE — 6360000002 HC RX W HCPCS: Performed by: NURSE PRACTITIONER

## 2024-04-30 PROCEDURE — 36415 COLL VENOUS BLD VENIPUNCTURE: CPT

## 2024-04-30 PROCEDURE — 93306 TTE W/DOPPLER COMPLETE: CPT

## 2024-04-30 PROCEDURE — 87641 MR-STAPH DNA AMP PROBE: CPT

## 2024-04-30 PROCEDURE — 86038 ANTINUCLEAR ANTIBODIES: CPT

## 2024-04-30 PROCEDURE — 80053 COMPREHEN METABOLIC PANEL: CPT

## 2024-04-30 PROCEDURE — 2500000003 HC RX 250 WO HCPCS: Performed by: NURSE PRACTITIONER

## 2024-04-30 PROCEDURE — 36600 WITHDRAWAL OF ARTERIAL BLOOD: CPT

## 2024-04-30 RX ORDER — SODIUM CHLORIDE, SODIUM LACTATE, POTASSIUM CHLORIDE, CALCIUM CHLORIDE 600; 310; 30; 20 MG/100ML; MG/100ML; MG/100ML; MG/100ML
INJECTION, SOLUTION INTRAVENOUS CONTINUOUS
Status: ACTIVE | OUTPATIENT
Start: 2024-04-30 | End: 2024-05-01

## 2024-04-30 RX ORDER — HYDROXYZINE PAMOATE 25 MG/1
25 CAPSULE ORAL 3 TIMES DAILY PRN
Status: DISCONTINUED | OUTPATIENT
Start: 2024-04-30 | End: 2024-04-30

## 2024-04-30 RX ORDER — QUETIAPINE FUMARATE 25 MG/1
25 TABLET, FILM COATED ORAL DAILY PRN
Status: ON HOLD | COMMUNITY

## 2024-04-30 RX ORDER — SODIUM CHLORIDE, SODIUM LACTATE, POTASSIUM CHLORIDE, CALCIUM CHLORIDE 600; 310; 30; 20 MG/100ML; MG/100ML; MG/100ML; MG/100ML
INJECTION, SOLUTION INTRAVENOUS CONTINUOUS
Status: ACTIVE | OUTPATIENT
Start: 2024-04-30 | End: 2024-04-30

## 2024-04-30 RX ORDER — SODIUM CHLORIDE, SODIUM LACTATE, POTASSIUM CHLORIDE, CALCIUM CHLORIDE 600; 310; 30; 20 MG/100ML; MG/100ML; MG/100ML; MG/100ML
INJECTION, SOLUTION INTRAVENOUS CONTINUOUS
Status: DISCONTINUED | OUTPATIENT
Start: 2024-04-30 | End: 2024-04-30

## 2024-04-30 RX ORDER — ALPRAZOLAM 0.5 MG/1
0.5 TABLET ORAL 3 TIMES DAILY PRN
Status: DISCONTINUED | OUTPATIENT
Start: 2024-04-30 | End: 2024-05-02 | Stop reason: HOSPADM

## 2024-04-30 RX ORDER — MUSCLE RUB CREAM 100; 150 MG/G; MG/G
CREAM TOPICAL PRN
Status: DISCONTINUED | OUTPATIENT
Start: 2024-04-30 | End: 2024-05-02 | Stop reason: HOSPADM

## 2024-04-30 RX ORDER — GUANFACINE 1 MG/1
.5-1 TABLET ORAL 2 TIMES DAILY PRN
Status: ON HOLD | COMMUNITY

## 2024-04-30 RX ADMIN — SODIUM CHLORIDE, POTASSIUM CHLORIDE, SODIUM LACTATE AND CALCIUM CHLORIDE: 600; 310; 30; 20 INJECTION, SOLUTION INTRAVENOUS at 04:08

## 2024-04-30 RX ADMIN — POTASSIUM CHLORIDE 40 MEQ: 1500 TABLET, EXTENDED RELEASE ORAL at 12:27

## 2024-04-30 RX ADMIN — ENOXAPARIN SODIUM 30 MG: 100 INJECTION SUBCUTANEOUS at 07:51

## 2024-04-30 RX ADMIN — PIPERACILLIN AND TAZOBACTAM 3375 MG: 3; .375 INJECTION, POWDER, FOR SOLUTION INTRAVENOUS at 06:05

## 2024-04-30 RX ADMIN — DIPHENHYDRAMINE HYDROCHLORIDE 25 MG: 50 INJECTION INTRAMUSCULAR; INTRAVENOUS at 00:02

## 2024-04-30 RX ADMIN — SODIUM CHLORIDE, POTASSIUM CHLORIDE, SODIUM LACTATE AND CALCIUM CHLORIDE: 600; 310; 30; 20 INJECTION, SOLUTION INTRAVENOUS at 11:43

## 2024-04-30 RX ADMIN — PIPERACILLIN AND TAZOBACTAM 3375 MG: 3; .375 INJECTION, POWDER, FOR SOLUTION INTRAVENOUS at 21:42

## 2024-04-30 RX ADMIN — FAMOTIDINE 20 MG: 10 INJECTION, SOLUTION INTRAVENOUS at 00:02

## 2024-04-30 RX ADMIN — ALPRAZOLAM 0.5 MG: 0.5 TABLET ORAL at 13:44

## 2024-04-30 RX ADMIN — SODIUM BICARBONATE 50 MEQ: 84 INJECTION, SOLUTION INTRAVENOUS at 01:04

## 2024-04-30 RX ADMIN — FAMOTIDINE 20 MG: 10 INJECTION, SOLUTION INTRAVENOUS at 19:53

## 2024-04-30 RX ADMIN — PIPERACILLIN AND TAZOBACTAM 4500 MG: 4; .5 INJECTION, POWDER, LYOPHILIZED, FOR SOLUTION INTRAVENOUS at 00:24

## 2024-04-30 RX ADMIN — SODIUM CHLORIDE, POTASSIUM CHLORIDE, SODIUM LACTATE AND CALCIUM CHLORIDE: 600; 310; 30; 20 INJECTION, SOLUTION INTRAVENOUS at 19:42

## 2024-04-30 RX ADMIN — ZOLPIDEM TARTRATE 10 MG: 5 TABLET ORAL at 21:43

## 2024-04-30 RX ADMIN — SODIUM BICARBONATE: 84 INJECTION, SOLUTION INTRAVENOUS at 05:02

## 2024-04-30 RX ADMIN — FAMOTIDINE 20 MG: 10 INJECTION, SOLUTION INTRAVENOUS at 07:51

## 2024-04-30 RX ADMIN — ALPRAZOLAM 0.5 MG: 0.5 TABLET ORAL at 21:43

## 2024-04-30 RX ADMIN — PIPERACILLIN AND TAZOBACTAM 3375 MG: 3; .375 INJECTION, POWDER, FOR SOLUTION INTRAVENOUS at 13:31

## 2024-04-30 NOTE — H&P
(postoperative nausea and vomiting)        Past Surgical History:        Procedure Laterality Date    ARTHRODESIS Left 2022    ARTHRODESIS 1ST METATARSOCUNEIFORM JOINT, DISTAL 1ST METATARSAL OSTEOTOMY, EXCISION OF PLANTAR FIBROMA WITH PLACEMENT OF AMNIOTIC MEMBRANE, PERCUTANEOUS FLEXOR TENOTOMY DIGITS 2,3,4,5 ALL ON THE LEFT FOOT performed by Edilberto Templeton DPM at [facility] [place] OR    BUNIONECTOMY Right 2022    1ST METATARSOCUNEIFORM ARTHRODESIS WITH TITANIUM WEDGE STAPLES RIGHT FOOT DISTAL CHEVRON BUNIONECTOMY WITH SCREW FIXATION RIGHT FOOT AKIN OSTEOTOMY WITH STAPLE FIXATION RIGHT FOOT PERCUTANEOUS FLEXOR TENOTOMY DIGIT 2, 3, 4, 5 RIGHT FOOT EXCISION OF PLANTAR FIBROMA RIGHT FOOT performed by Edilberto Templeton DPM at [facility] OR    CARDIAC SURGERY       SECTION      x2    COLONOSCOPY      EYE SURGERY      at age 2Years??   cross eyed    FOOT TENDON SURGERY Left 2022    PERCUTANEOUS EXTENSOR TENOTOMY DIGITS 2,3,4, 5TH DIGIT LEFT FOOT performed by Edilberto Templeton DPM at [facility] OR    [facility]         Medications Prior to Admission:    Prior to Admission medications    Medication Sig Start Date End Date Taking? Authorizing Provider   ALPRAZolam (XANAX) 0.5 MG tablet Take 1 tablet by mouth 3 times daily as needed for Anxiety for up to 30 days. Max Daily Amount: 1.5 mg 24  Kevin Dunn MD   FLUoxetine (PROZAC) 40 MG capsule Take 1 capsule by mouth daily 24   Kevin Dunn MD   zolpidem (AMBIEN) 10 MG tablet Take 1 tablet by mouth nightly as needed for Sleep for up to 30 days. Max Daily Amount: 10 mg 24  Kevin Dunn MD       Allergies:  Doxycycline    Social History:   TOBACCO:   reports that she has never smoked. She has been exposed to tobacco smoke. She has never used smokeless tobacco.  ETOH:   reports current alcohol use.  OCCUPATION:      Family History:       Problem Relation Age of Onset    COPD Mother     High Blood Pressure

## 2024-04-30 NOTE — CONSULTS
Patient interviewed  Meds and chart reviewed  Orders written  Yes  Full consult will be dictated  Thanks for the consult.

## 2024-04-30 NOTE — PLAN OF CARE
Problem: Discharge Planning  Goal: Discharge to home or other facility with appropriate resources  Outcome: Progressing  Flowsheets (Taken 4/30/2024 1328)  Discharge to home or other facility with appropriate resources:   Identify barriers to discharge with patient and caregiver   Arrange for needed discharge resources and transportation as appropriate   Identify discharge learning needs (meds, wound care, etc)     Problem: Self Harm/Suicidality  Goal: Will have no self-injury during hospital stay  Description: INTERVENTIONS:  1.  Ensure constant observer at bedside with Q15M safety checks  2.  Maintain a safe environment  3.  Secure patient belongings  4.  Ensure family/visitors adhere to safety recommendations  5.  Ensure safety tray has been added to patient's diet order  6.  Every shift and PRN: Re-assess suicidal risk via Frequent Screener    Outcome: Progressing  Flowsheets (Taken 4/30/2024 1328)  Will have no self-injury during hospital stay:   Ensure constant observer at bedside with Q15M safety checks   Maintain a safe environment   Secure patient belongings   Ensure family/visitors adhere to safety recommendations   Ensure safety tray has been added to patient's diet order   Every shift and PRN: Re-assess suicidal risk via Frequent Screener     Problem: Neurosensory - Adult  Goal: Achieves stable or improved neurological status  Outcome: Progressing  Flowsheets (Taken 4/30/2024 1328)  Achieves stable or improved neurological status: Assess for and report changes in neurological status     Problem: Cardiovascular - Adult  Goal: Maintains optimal cardiac output and hemodynamic stability  Outcome: Progressing  Flowsheets (Taken 4/30/2024 1328)  Maintains optimal cardiac output and hemodynamic stability: Monitor blood pressure and heart rate  Goal: Absence of cardiac dysrhythmias or at baseline  Outcome: Progressing  Flowsheets (Taken 4/30/2024 1328)  Absence of cardiac dysrhythmias or at baseline: Monitor

## 2024-04-30 NOTE — CARE COORDINATION
04/30/24 0935   Readmission Assessment   Number of Days since last admission? 1-7 days   Previous Disposition Home with Family   Who is being Interviewed Patient   What was the patient's/caregiver's perception as to why they think they needed to return back to the hospital? Other (Comment)  (Suicidal Ideation)   Did you visit your Primary Care Physician after you left the hospital, before you returned this time? No   Why weren't you able to visit your PCP? Other (Comment)  (readmitted too soon)   Did you see a specialist, such as Cardiac, Pulmonary, Orthopedic Physician, etc. after you left the hospital? Yes   Who advised the patient to return to the hospital? Caregiver   Does the patient report anything that got in the way of taking their medications? No   In our efforts to provide the best possible care to you and others like you, can you think of anything that we could have done to help you after you left the hospital the first time, so that you might not have needed to return so soon? Other (Comment)  (no)

## 2024-04-30 NOTE — SIGNIFICANT EVENT
Nyasia Bella presented to [facility] ER 4/29/2024 from [place] by LACP for suicidal behavior.  Patient was trying to strangle herself with anything she could get her hands on.  On arrival patient was found completely naked.  Patient resisting to put clothes on.  Patient will not answer questions and is not cooperative.  [facility] Crisis Assessment identifies conversation with family that had occurred.  Family reports that they had called [facility] the day of admission and was told that they would need to follow-up with [facility].  The daughter requested follow-up because she does not have any release of information on file.  It was documented that the patient tied a drawstring from her pants around her neck and was found during hourly rounds.  Patient's feet were turned under.  She reports that the string had to be cut off patient's neck as staff could not untie it.    Patient has a very diffuse rash around neck area.  CT imaging of neck was completed.  No acute process.  3.5 cm heterogeneous lesion involving the right lobe of the thyroid gland which deviates the trachea to the left.  No soft tissue hematoma identified.  I did reach out to Dr. Norris Glover on-call trauma to ask if trauma needed to evaluate patient.  He identified that trauma did not need to see this patient and that the patient had the appropriate imaging with CT of the neck showing no traumatic injuries.  Recommendations of Thyroid US be completed.

## 2024-04-30 NOTE — ED NOTES
Patient resting in bed with eyes closed. Respirations easy and unlabored. No distress noted. Call light within reach. Sitter remains at the bedside.

## 2024-04-30 NOTE — CARE COORDINATION
Case Management Assessment Initial Evaluation    Date/Time of Evaluation: 4/30/2024 10:11 AM  Assessment Completed by: Eusebio Naranjo RN    If patient is discharged prior to next notation, then this note serves as note for discharge by case management.    Patient Name: Nyasia Bella                   YOB: 1965  Diagnosis: Suicidal ideation [R45.851]  Suicide attempt (HCC) [T14.91XA]  Metabolic acidosis [E87.20]  Depression, unspecified depression type [F32.A]                   Date / Time: 4/29/2024  9:26 AM  Location: [facility]01/001-     Patient Admission Status: Inpatient   Readmission Risk Low 0-14, Mod 15-19), High > 20: Readmission Risk Score: 13.9    Current PCP: Sri Powers APRN - CNP    Additional Case Management Notes:   Suicidal Ideation/Traumatic Rhabdomyolysis  History: Alcohol Use    Patient Goals/Plan/Treatment Preferences: lives w spouse Manpreet who transports; sitter in room, self-pay status; Public Benefits following; monitor med assist, current w [employer]          04/30/24 0962   Service Assessment   Patient Orientation Alert and Oriented   Cognition Alert   History Provided By Patient;Medical Record   Primary Caregiver Self   Accompanied By/Relationship sitter   Support Systems Children;Spouse/Significant Other   Patient's Healthcare Decision Maker is: Legal Next of Kin   PCP Verified by CM Yes   Last Visit to PCP Within last 6 months   Prior Functional Level Independent in ADLs/IADLs   Current Functional Level Independent in ADLs/IADLs   Can patient return to prior living arrangement Unknown at present   Ability to make needs known: Fair   Family able to assist with home care needs: Yes   Would you like for me to discuss the discharge plan with any other family members/significant others, and if so, who? No   Financial Resources Financial Counseling   Community Resources Psychiatric Treatment   CM/SW Referral Difficulty coping/adjusting to illness

## 2024-05-01 PROBLEM — E44.0 MODERATE MALNUTRITION (HCC): Status: ACTIVE | Noted: 2024-05-01

## 2024-05-01 LAB
ANION GAP SERPL CALC-SCNC: 9 MEQ/L (ref 8–16)
BASE EXCESS BLDA CALC-SCNC: 4.6 MMOL/L (ref -2–3)
BASOPHILS ABSOLUTE: 0 THOU/MM3 (ref 0–0.1)
BASOPHILS NFR BLD AUTO: 0.4 %
BUN SERPL-MCNC: 5 MG/DL (ref 7–22)
CALCIUM SERPL-MCNC: 8.5 MG/DL (ref 8.5–10.5)
CHLORIDE SERPL-SCNC: 108 MEQ/L (ref 98–111)
CK SERPL-CCNC: 1320 U/L (ref 30–135)
CK SERPL-CCNC: 1472 U/L (ref 30–135)
CO2 SERPL-SCNC: 27 MEQ/L (ref 23–33)
COLLECTED BY:: ABNORMAL
CREAT SERPL-MCNC: 0.7 MG/DL (ref 0.4–1.2)
DEPRECATED RDW RBC AUTO: 41.5 FL (ref 35–45)
EOSINOPHIL NFR BLD AUTO: 1.1 %
EOSINOPHILS ABSOLUTE: 0.1 THOU/MM3 (ref 0–0.4)
ERYTHROCYTE [DISTWIDTH] IN BLOOD BY AUTOMATED COUNT: 12.6 % (ref 11.5–14.5)
GFR SERPL CREATININE-BSD FRML MDRD: > 90 ML/MIN/1.73M2
GLUCOSE SERPL-MCNC: 97 MG/DL (ref 70–108)
HCO3 BLDA-SCNC: 30 MMOL/L (ref 23–28)
HCT VFR BLD AUTO: 35.7 % (ref 37–47)
HGB BLD-MCNC: 12.4 GM/DL (ref 12–16)
IMM GRANULOCYTES # BLD AUTO: 0.03 THOU/MM3 (ref 0–0.07)
IMM GRANULOCYTES NFR BLD AUTO: 0.3 %
LYMPHOCYTES ABSOLUTE: 4 THOU/MM3 (ref 1–4.8)
LYMPHOCYTES NFR BLD AUTO: 40.5 %
MAGNESIUM SERPL-MCNC: 1.7 MG/DL (ref 1.6–2.4)
MCH RBC QN AUTO: 31.7 PG (ref 26–33)
MCHC RBC AUTO-ENTMCNC: 34.7 GM/DL (ref 32.2–35.5)
MCV RBC AUTO: 91.3 FL (ref 81–99)
MONOCYTES ABSOLUTE: 1 THOU/MM3 (ref 0.4–1.3)
MONOCYTES NFR BLD AUTO: 10.5 %
NEUTROPHILS ABSOLUTE: 4.6 THOU/MM3 (ref 1.8–7.7)
NEUTROPHILS NFR BLD AUTO: 47.2 %
NRBC BLD AUTO-RTO: 0 /100 WBC
PCO2 TEMP ADJ BLDMV: 45 MMHG (ref 41–51)
PH BLDMV: 7.43 [PH] (ref 7.31–7.41)
PLATELET # BLD AUTO: 173 THOU/MM3 (ref 130–400)
PMV BLD AUTO: 10.5 FL (ref 9.4–12.4)
PO2 BLDMV: 76 MMHG (ref 25–40)
POTASSIUM SERPL-SCNC: 3.2 MEQ/L (ref 3.5–5.2)
RBC # BLD AUTO: 3.91 MILL/MM3 (ref 4.2–5.4)
SAO2 % BLDMV: 95 %
SODIUM SERPL-SCNC: 144 MEQ/L (ref 135–145)
WBC # BLD AUTO: 9.8 THOU/MM3 (ref 4.8–10.8)

## 2024-05-01 PROCEDURE — 6360000002 HC RX W HCPCS: Performed by: NURSE PRACTITIONER

## 2024-05-01 PROCEDURE — 2500000003 HC RX 250 WO HCPCS: Performed by: NURSE PRACTITIONER

## 2024-05-01 PROCEDURE — 2580000003 HC RX 258: Performed by: NURSE PRACTITIONER

## 2024-05-01 PROCEDURE — 80048 BASIC METABOLIC PNL TOTAL CA: CPT

## 2024-05-01 PROCEDURE — 36415 COLL VENOUS BLD VENIPUNCTURE: CPT

## 2024-05-01 PROCEDURE — 82550 ASSAY OF CK (CPK): CPT

## 2024-05-01 PROCEDURE — 82803 BLOOD GASES ANY COMBINATION: CPT

## 2024-05-01 PROCEDURE — 6370000000 HC RX 637 (ALT 250 FOR IP): Performed by: NURSE PRACTITIONER

## 2024-05-01 PROCEDURE — 83735 ASSAY OF MAGNESIUM: CPT

## 2024-05-01 PROCEDURE — 85025 COMPLETE CBC W/AUTO DIFF WBC: CPT

## 2024-05-01 PROCEDURE — 2580000003 HC RX 258: Performed by: INTERNAL MEDICINE

## 2024-05-01 PROCEDURE — 99232 SBSQ HOSP IP/OBS MODERATE 35: CPT | Performed by: INTERNAL MEDICINE

## 2024-05-01 PROCEDURE — 6370000000 HC RX 637 (ALT 250 FOR IP): Performed by: INTERNAL MEDICINE

## 2024-05-01 PROCEDURE — 2060000000 HC ICU INTERMEDIATE R&B

## 2024-05-01 PROCEDURE — A4216 STERILE WATER/SALINE, 10 ML: HCPCS | Performed by: NURSE PRACTITIONER

## 2024-05-01 PROCEDURE — 97162 PT EVAL MOD COMPLEX 30 MIN: CPT

## 2024-05-01 RX ORDER — SODIUM CHLORIDE 9 MG/ML
INJECTION, SOLUTION INTRAVENOUS CONTINUOUS
Status: DISCONTINUED | OUTPATIENT
Start: 2024-05-01 | End: 2024-05-02 | Stop reason: HOSPADM

## 2024-05-01 RX ORDER — FLUOXETINE HYDROCHLORIDE 20 MG/1
40 CAPSULE ORAL DAILY
Status: DISCONTINUED | OUTPATIENT
Start: 2024-05-01 | End: 2024-05-02 | Stop reason: HOSPADM

## 2024-05-01 RX ADMIN — FAMOTIDINE 20 MG: 10 INJECTION, SOLUTION INTRAVENOUS at 20:38

## 2024-05-01 RX ADMIN — SODIUM CHLORIDE, PRESERVATIVE FREE 10 ML: 5 INJECTION INTRAVENOUS at 20:38

## 2024-05-01 RX ADMIN — ZOLPIDEM TARTRATE 10 MG: 5 TABLET ORAL at 21:43

## 2024-05-01 RX ADMIN — FLUOXETINE 40 MG: 20 CAPSULE ORAL at 10:12

## 2024-05-01 RX ADMIN — ALPRAZOLAM 0.5 MG: 0.5 TABLET ORAL at 09:52

## 2024-05-01 RX ADMIN — PIPERACILLIN AND TAZOBACTAM 3375 MG: 3; .375 INJECTION, POWDER, FOR SOLUTION INTRAVENOUS at 21:43

## 2024-05-01 RX ADMIN — POTASSIUM CHLORIDE 40 MEQ: 1500 TABLET, EXTENDED RELEASE ORAL at 05:34

## 2024-05-01 RX ADMIN — ENOXAPARIN SODIUM 30 MG: 100 INJECTION SUBCUTANEOUS at 09:52

## 2024-05-01 RX ADMIN — ALPRAZOLAM 0.5 MG: 0.5 TABLET ORAL at 21:43

## 2024-05-01 RX ADMIN — PIPERACILLIN AND TAZOBACTAM 3375 MG: 3; .375 INJECTION, POWDER, FOR SOLUTION INTRAVENOUS at 05:34

## 2024-05-01 RX ADMIN — PIPERACILLIN AND TAZOBACTAM 3375 MG: 3; .375 INJECTION, POWDER, FOR SOLUTION INTRAVENOUS at 14:47

## 2024-05-01 RX ADMIN — FAMOTIDINE 20 MG: 10 INJECTION, SOLUTION INTRAVENOUS at 09:52

## 2024-05-01 RX ADMIN — SODIUM CHLORIDE: 9 INJECTION, SOLUTION INTRAVENOUS at 12:56

## 2024-05-01 NOTE — PLAN OF CARE
Problem: Discharge Planning  Goal: Discharge to home or other facility with appropriate resources  5/1/2024 0225 by Alivia Kellogg RN  Outcome: Progressing  Flowsheets (Taken 5/1/2024 0225)  Discharge to home or other facility with appropriate resources:   Identify barriers to discharge with patient and caregiver   Arrange for needed discharge resources and transportation as appropriate   Identify discharge learning needs (meds, wound care, etc)     Problem: Self Harm/Suicidality  Goal: Will have no self-injury during hospital stay  Description: INTERVENTIONS:  1.  Ensure constant observer at bedside with Q15M safety checks  2.  Maintain a safe environment  3.  Secure patient belongings  4.  Ensure family/visitors adhere to safety recommendations  5.  Ensure safety tray has been added to patient's diet order  6.  Every shift and PRN: Re-assess suicidal risk via Frequent Screener    5/1/2024 0225 by Alivia Kellogg RN  Outcome: Progressing  Flowsheets (Taken 5/1/2024 0225)  Will have no self-injury during hospital stay:   Ensure constant observer at bedside with Q15M safety checks   Ensure family/visitors adhere to safety recommendations   Maintain a safe environment   Secure patient belongings   Ensure safety tray has been added to patient's diet order   Every shift and PRN: Re-assess suicidal risk via Frequent Screener     Problem: Neurosensory - Adult  Goal: Achieves stable or improved neurological status  5/1/2024 0225 by Alivia Kellogg RN  Outcome: Progressing  Flowsheets (Taken 5/1/2024 0225)  Achieves stable or improved neurological status: Assess for and report changes in neurological status     Problem: Cardiovascular - Adult  Goal: Maintains optimal cardiac output and hemodynamic stability  5/1/2024 0225 by Alivia Kellogg RN  Outcome: Progressing  Flowsheets (Taken 5/1/2024 0225)  Maintains optimal cardiac output and hemodynamic stability:   Monitor blood pressure and heart rate   Monitor

## 2024-05-01 NOTE — PLAN OF CARE
Problem: Discharge Planning  Goal: Discharge to home or other facility with appropriate resources  5/1/2024 1625 by Guido Maguire, RN  Outcome: Progressing  Flowsheets (Taken 5/1/2024 1625)  Discharge to home or other facility with appropriate resources:   Identify barriers to discharge with patient and caregiver   Identify discharge learning needs (meds, wound care, etc)   Refer to discharge planning if patient needs post-hospital services based on physician order or complex needs related to functional status, cognitive ability or social support system   Arrange for needed discharge resources and transportation as appropriate  5/1/2024 0225 by Alivia Kellogg, RN  Outcome: Progressing  Flowsheets (Taken 5/1/2024 0225)  Discharge to home or other facility with appropriate resources:   Identify barriers to discharge with patient and caregiver   Arrange for needed discharge resources and transportation as appropriate   Identify discharge learning needs (meds, wound care, etc)     Problem: Self Harm/Suicidality  Goal: Will have no self-injury during hospital stay  Description: INTERVENTIONS:  1.  Ensure constant observer at bedside with Q15M safety checks  2.  Maintain a safe environment  3.  Secure patient belongings  4.  Ensure family/visitors adhere to safety recommendations  5.  Ensure safety tray has been added to patient's diet order  6.  Every shift and PRN: Re-assess suicidal risk via Frequent Screener    5/1/2024 1625 by Guido Maguire RN  Outcome: Progressing  Flowsheets (Taken 5/1/2024 1625)  Will have no self-injury during hospital stay:   Ensure constant observer at bedside with Q15M safety checks   Maintain a safe environment   Ensure safety tray has been added to patient's diet order   Secure patient belongings   Ensure family/visitors adhere to safety recommendations   Every shift and PRN: Re-assess suicidal risk via Frequent Screener  5/1/2024 0225 by Alivia Kellogg, RN  Outcome:

## 2024-05-01 NOTE — CONSULTS
[address] [address]                              CONSULTATION      PATIENT NAME: GINGER BOURNE           : 1965  MED REC NO: [id number]                       ROOM: [facility]  ACCOUNT NO: [id number]                       ADMIT DATE: 2024  PROVIDER: Kevin Dunn MD      Consult to Tigist Silva.    REASON FOR CONSULT:  Suicidal ideation.    SOURCE OF INFORMATION:  The patient and electronic medical record.    IDENTIFYING INFORMATION:  The patient is a 58-year-old   female.  She is the mother of 2 children.  She lives with her .  She is unemployed.    HISTORY OF PRESENT ILLNESS:  The patient was brought to [facility] due to suicide attempt.  According to record, she tried to strangle herself with anything she could get her hands on at home.  In the emergency room, she was not answering questions.  She reports she has been feeling depressed for about 2 weeks and having suicidal thoughts for the past week.  She was admitted to the psychiatric unit from  to  due to depression and suicidal thoughts.  She says since her discharge she went to a psychiatric provider at [facility].  She believes that some of her psychotropics were changed.  At any rate, she has been feeling more depressed past few weeks and she has been having suicidal thoughts for the past week.  She admits that she was trying to strangle herself.  She feels hopeless and worthless.  She has poor energy and poor appetite, and she does not enjoy things anymore.    PAST PSYCHIATRIC FAMILY AND SOCIAL HISTORY:  See H and P on 2024.  Again, she was admitted to our psychiatric unit from  to , and was discharged on fluoxetine, zolpidem, alprazolam.    MEDICAL AND SURGICAL HISTORY:  History of patent foramen ovale, bunionectomy, cardiac surgery, , colonoscopy, eye

## 2024-05-02 ENCOUNTER — HOSPITAL ENCOUNTER (INPATIENT)
Age: 59
LOS: 4 days | Discharge: HOME | DRG: 885 | End: 2024-05-06
Attending: PSYCHIATRY & NEUROLOGY | Admitting: PSYCHIATRY & NEUROLOGY
Payer: COMMERCIAL

## 2024-05-02 VITALS
TEMPERATURE: 98.4 F | DIASTOLIC BLOOD PRESSURE: 90 MMHG | HEIGHT: 59 IN | RESPIRATION RATE: 17 BRPM | HEART RATE: 87 BPM | SYSTOLIC BLOOD PRESSURE: 155 MMHG | OXYGEN SATURATION: 100 % | BODY MASS INDEX: 22.8 KG/M2 | WEIGHT: 113.1 LBS

## 2024-05-02 PROBLEM — F33.0 MDD (MAJOR DEPRESSIVE DISORDER), RECURRENT EPISODE, MILD (HCC): Status: ACTIVE | Noted: 2024-05-02

## 2024-05-02 LAB
BILIRUB UR QL STRIP.AUTO: NEGATIVE
CHARACTER UR: CLEAR
CK SERPL-CCNC: 1032 U/L (ref 30–135)
COLOR: YELLOW
GLUCOSE UR QL STRIP.AUTO: NEGATIVE MG/DL
HGB UR QL STRIP.AUTO: NEGATIVE
KETONES UR QL STRIP.AUTO: NEGATIVE
NITRITE UR QL STRIP: NEGATIVE
NUCLEAR IGG SER QL IA: NORMAL
PH UR STRIP.AUTO: 7.5 [PH] (ref 5–9)
PROT UR STRIP.AUTO-MCNC: NEGATIVE MG/DL
SP GR UR REFRACT.AUTO: 1.01 (ref 1–1.03)
UROBILINOGEN, URINE: 0.2 EU/DL (ref 0–1)
WBC #/AREA URNS HPF: NEGATIVE /[HPF]

## 2024-05-02 PROCEDURE — 6370000000 HC RX 637 (ALT 250 FOR IP): Performed by: NURSE PRACTITIONER

## 2024-05-02 PROCEDURE — 36415 COLL VENOUS BLD VENIPUNCTURE: CPT

## 2024-05-02 PROCEDURE — 2580000003 HC RX 258: Performed by: INTERNAL MEDICINE

## 2024-05-02 PROCEDURE — 6360000002 HC RX W HCPCS: Performed by: NURSE PRACTITIONER

## 2024-05-02 PROCEDURE — 2500000003 HC RX 250 WO HCPCS: Performed by: NURSE PRACTITIONER

## 2024-05-02 PROCEDURE — A4216 STERILE WATER/SALINE, 10 ML: HCPCS | Performed by: NURSE PRACTITIONER

## 2024-05-02 PROCEDURE — 82550 ASSAY OF CK (CPK): CPT

## 2024-05-02 PROCEDURE — 1240000000 HC EMOTIONAL WELLNESS R&B

## 2024-05-02 PROCEDURE — 6370000000 HC RX 637 (ALT 250 FOR IP): Performed by: PSYCHIATRY & NEUROLOGY

## 2024-05-02 PROCEDURE — 6370000000 HC RX 637 (ALT 250 FOR IP): Performed by: INTERNAL MEDICINE

## 2024-05-02 PROCEDURE — 81003 URINALYSIS AUTO W/O SCOPE: CPT

## 2024-05-02 PROCEDURE — 2580000003 HC RX 258: Performed by: NURSE PRACTITIONER

## 2024-05-02 RX ORDER — POLYETHYLENE GLYCOL 3350 17 G/17G
17 POWDER, FOR SOLUTION ORAL DAILY PRN
Status: DISCONTINUED | OUTPATIENT
Start: 2024-05-02 | End: 2024-05-06 | Stop reason: HOSPADM

## 2024-05-02 RX ORDER — IBUPROFEN 400 MG/1
400 TABLET ORAL EVERY 6 HOURS PRN
Status: CANCELLED | OUTPATIENT
Start: 2024-05-02

## 2024-05-02 RX ORDER — GUANFACINE 1 MG/1
1 TABLET ORAL 2 TIMES DAILY PRN
Status: DISCONTINUED | OUTPATIENT
Start: 2024-05-02 | End: 2024-05-06 | Stop reason: HOSPADM

## 2024-05-02 RX ORDER — POLYETHYLENE GLYCOL 3350 17 G/17G
17 POWDER, FOR SOLUTION ORAL DAILY PRN
Status: CANCELLED | OUTPATIENT
Start: 2024-05-02

## 2024-05-02 RX ORDER — IBUPROFEN 400 MG/1
400 TABLET ORAL EVERY 6 HOURS PRN
Status: DISCONTINUED | OUTPATIENT
Start: 2024-05-02 | End: 2024-05-06 | Stop reason: HOSPADM

## 2024-05-02 RX ORDER — ACETAMINOPHEN 325 MG/1
650 TABLET ORAL EVERY 4 HOURS PRN
Status: DISCONTINUED | OUTPATIENT
Start: 2024-05-02 | End: 2024-05-06 | Stop reason: HOSPADM

## 2024-05-02 RX ORDER — FLUOXETINE HYDROCHLORIDE 20 MG/1
40 CAPSULE ORAL DAILY
Status: DISCONTINUED | OUTPATIENT
Start: 2024-05-03 | End: 2024-05-04

## 2024-05-02 RX ORDER — ACETAMINOPHEN 325 MG/1
650 TABLET ORAL EVERY 4 HOURS PRN
Status: CANCELLED | OUTPATIENT
Start: 2024-05-02

## 2024-05-02 RX ORDER — HYDROXYZINE HYDROCHLORIDE 25 MG/1
50 TABLET, FILM COATED ORAL 3 TIMES DAILY PRN
Status: DISCONTINUED | OUTPATIENT
Start: 2024-05-02 | End: 2024-05-06 | Stop reason: HOSPADM

## 2024-05-02 RX ORDER — TRAZODONE HYDROCHLORIDE 50 MG/1
50 TABLET ORAL NIGHTLY PRN
Status: DISCONTINUED | OUTPATIENT
Start: 2024-05-02 | End: 2024-05-04

## 2024-05-02 RX ADMIN — GUANFACINE 1 MG: 1 TABLET ORAL at 21:20

## 2024-05-02 RX ADMIN — FLUOXETINE 40 MG: 20 CAPSULE ORAL at 08:55

## 2024-05-02 RX ADMIN — ENOXAPARIN SODIUM 30 MG: 100 INJECTION SUBCUTANEOUS at 08:55

## 2024-05-02 RX ADMIN — SODIUM CHLORIDE: 9 INJECTION, SOLUTION INTRAVENOUS at 06:34

## 2024-05-02 RX ADMIN — FAMOTIDINE 20 MG: 10 INJECTION, SOLUTION INTRAVENOUS at 08:55

## 2024-05-02 RX ADMIN — TRAZODONE HYDROCHLORIDE 50 MG: 50 TABLET ORAL at 21:19

## 2024-05-02 RX ADMIN — PIPERACILLIN AND TAZOBACTAM 3375 MG: 3; .375 INJECTION, POWDER, FOR SOLUTION INTRAVENOUS at 06:32

## 2024-05-02 RX ADMIN — ALPRAZOLAM 0.5 MG: 0.5 TABLET ORAL at 06:13

## 2024-05-02 RX ADMIN — SODIUM CHLORIDE, PRESERVATIVE FREE 10 ML: 5 INJECTION INTRAVENOUS at 08:55

## 2024-05-02 RX ADMIN — HYDROXYZINE HYDROCHLORIDE 50 MG: 25 TABLET, FILM COATED ORAL at 21:20

## 2024-05-02 ASSESSMENT — PAIN SCALES - GENERAL
PAINLEVEL_OUTOF10: 0
PAINLEVEL_OUTOF10: 0

## 2024-05-02 ASSESSMENT — SLEEP AND FATIGUE QUESTIONNAIRES
DO YOU HAVE DIFFICULTY SLEEPING: NO
SLEEP PATTERN: DIFFICULTY FALLING ASLEEP;DISTURBED/INTERRUPTED SLEEP
AVERAGE NUMBER OF SLEEP HOURS: 8
DO YOU USE A SLEEP AID: YES

## 2024-05-02 ASSESSMENT — PATIENT HEALTH QUESTIONNAIRE - PHQ9
8. MOVING OR SPEAKING SO SLOWLY THAT OTHER PEOPLE COULD HAVE NOTICED. OR THE OPPOSITE, BEING SO FIGETY OR RESTLESS THAT YOU HAVE BEEN MOVING AROUND A LOT MORE THAN USUAL: MORE THAN HALF THE DAYS
4. FEELING TIRED OR HAVING LITTLE ENERGY: MORE THAN HALF THE DAYS
SUM OF ALL RESPONSES TO PHQ QUESTIONS 1-9: 18
1. LITTLE INTEREST OR PLEASURE IN DOING THINGS: MORE THAN HALF THE DAYS
6. FEELING BAD ABOUT YOURSELF - OR THAT YOU ARE A FAILURE OR HAVE LET YOURSELF OR YOUR FAMILY DOWN: MORE THAN HALF THE DAYS
SUM OF ALL RESPONSES TO PHQ QUESTIONS 1-9: 18
SUM OF ALL RESPONSES TO PHQ QUESTIONS 1-9: 18
7. TROUBLE CONCENTRATING ON THINGS, SUCH AS READING THE NEWSPAPER OR WATCHING TELEVISION: MORE THAN HALF THE DAYS
2. FEELING DOWN, DEPRESSED OR HOPELESS: MORE THAN HALF THE DAYS
3. TROUBLE FALLING OR STAYING ASLEEP: MORE THAN HALF THE DAYS
5. POOR APPETITE OR OVEREATING: MORE THAN HALF THE DAYS
9. THOUGHTS THAT YOU WOULD BE BETTER OFF DEAD, OR OF HURTING YOURSELF: MORE THAN HALF THE DAYS
10. IF YOU CHECKED OFF ANY PROBLEMS, HOW DIFFICULT HAVE THESE PROBLEMS MADE IT FOR YOU TO DO YOUR WORK, TAKE CARE OF THINGS AT HOME, OR GET ALONG WITH OTHER PEOPLE: VERY DIFFICULT
SUM OF ALL RESPONSES TO PHQ QUESTIONS 1-9: 16
SUM OF ALL RESPONSES TO PHQ9 QUESTIONS 1 & 2: 4

## 2024-05-02 NOTE — PROGRESS NOTES
Progress Note    Patient:  Nyasia Bella    Unit/Bed:4K-01/001-A  YOB: 1965  MRN: [id number]   Acct: [id number]   Admit date: 4/29/2024      Principal Problem:    Suicidal ideation  Active Problems:    Severe episode of recurrent major depressive disorder, without psychotic features (HCC)    BITA (generalized anxiety disorder)    Panic disorder without agoraphobia    Hanging    High anion gap metabolic acidosis    Traumatic rhabdomyolysis (HCC)    Thyroid lesion    Suicide attempt (HCC)    Moderate malnutrition (HCC)  Resolved Problems:    * No resolved hospital problems. *    Disposition continued inpatient care secondary medical problems listed below      Assessment and Plan:  Traumatic rhabdomyolysis the patient's total CK still above 1300 I will have to resume IV isotonic fluids and request repeat CK tomorrow I really would like this patient's values to be closer to 5-600 by tomorrow afternoon prior to transfer in addition to that I would like to observe the patient to continuing to take p.o. intake and fluids to ensure resolution of rhabdomyolysis since she has adequate renal function  Metabolic acidosis patient serum bicarb is improved normally overnight I will repeat VBG to assess her serum bicarb to ensure resolution of acidosis is there I am also resuming IV isotonic fluids to address problem #1  Suicidal attempt with hanging patient has no signs of stridor she is able to speak in complete sentences she has been cleared by trauma I will likely medically clear this patient for transfer to inpatient psychiatric service by 5/2/2024  Systemic inflammatory response syndrome patient appears to be on Zosyn I will continue his antibiotic for now and likely discontinue it 5/2/2024 she appears to be afebrile  Thyroid lesions I will review her TSH and free T4        Patient Seen, Chart, Consults notes, Labs, Radiology studies reviewed.    Subjective: Day 2 of stay with 
    Reinforced suicide precautions with patient.  Reinforced that visitors will be screened and may be limited at the discretion of the nurse.  Visitor belongings are subject to be searched.  Belongings may not be allowed into the patient room.    Reviewed any personal belongings from the previous shift believed to be a threat to patient safety removed from room.  Belongings removed include:  belongings removed at admission   Placed in secure area campus security .    Room screened for safety, items removed to create a safe environment include:   Blood pressure cuff   Loose or extra cords, tubing (not of medical necessity)   Extra Linens   Telephone   Toiletries   Trash Liners   Patient's cell phone and charging cord (must be removed from room)      Safety tray ordered: yes    Expectations were discussed with sitter (unit support aide).  Sitter positioned near exit.  Sitter reminded that patient should be observed at all times including toileting and bathing.  Sitter has  documentation sheet.    Patient and sitter included in hourly rounds.   
    Reinforced suicide precautions with patient.  Reinforced that visitors will be screened and may be limited at the discretion of the nurse.  Visitor belongings are subject to be searched.  Belongings may not be allowed into the patient room.    Reviewed any personal belongings from the previous shift believed to be a threat to patient safety removed from room.  Belongings removed include:  none   Placed in secure area n/a .    Room screened for safety, items removed to create a safe environment include:   Blood pressure cuff   Loose or extra cords, tubing (not of medical necessity)   Extra Linens   Telephone   Toiletries   Trash Liners   Patient's cell phone and charging cord (must be removed from room)      Safety tray ordered: yes    Expectations were discussed with sitter (unit support aide).  Sitter positioned near exit.  Sitter reminded that patient should be observed at all times including toileting and bathing.  Sitter has  documentation sheet.    Patient and sitter included in hourly rounds.    
  Physician Progress Note      PATIENT:               GINGER BOURNE  [facility] #:                  [id number]  :                       1965  ADMIT DATE:       2024 9:26 AM  DISCH DATE:  RESPONDING  PROVIDER #:        Norris Melgar MD          QUERY TEXT:    Patient admitted with suicide attempt and Traumatic rhabdomyolysis. Noted to   have moderate malnutrition per RD Progress note Dated . If possible, please   document in progress notes and discharge summary if you are evaluating and   /or treating any of the following:    The medical record reflects the following:  Risk Factors: 59 y/o, anxiety, depression, suicide attempt    Clinical Indicators: Per RD Malnutrition Assessment:  * Status:  Moderate malnutrition (24 1786)  * Context:  Chronic Illness  * Findings of the 6 clinical characteristics of malnutrition: (+ findings)  +   Energy Intake:  Mild decrease in energy intake (Comment) (75% or less x 3   weeks per )  +   Body Fat Loss:  Mild body fat loss Orbital, Fat Overlying Ribs  +   Muscle Mass Loss:  Mild muscle mass loss Temples (temporalis), Clavicles   (pectoralis & deltoids), Hand (interosseous)    Treatment: RD Consult with Nutrition Recommendations/Plan:  1. Continue current diet.  2. Send Ensure Compact TID. Gave examples of other ONS to use at home to boost   her energy such as Boost, Premier Protein Shake, CIB)  3. Consider Vitamin  B12 & D level as appropriate.  4. Consider MVI as appropriate.    ASPEN Criteria:    https://aspenjournals.onlinelibrary.marlow.com/doi/full/10.1177/843766061328172  5    Thank you,  Susan DOYLE, RN, CCDS  Please contact me for any questions or concerns regarding this query at   [email or web address]  Options provided:  -- Protein calorie malnutrition moderate  -- Other - I will add my own diagnosis  -- Disagree - Not applicable / Not valid  -- Disagree - Clinically unable to determine / Unknown  -- Refer to Clinical Documentation 
ABG obtained on 4/30/24 at 0012 did not cross over into [facility]. Results on room air are as follows: pH 7.28, CO2 24, PaO2 88, HCO3 11, BE -13.5, SaO2 96%. A repeat ABG was drawn this morning at 0532 and crossed over into [facility].  
Comprehensive Nutrition Assessment    Type and Reason for Visit:  Initial, Positive Nutrition Screen (unplanned wt loss poor po/appetite)    Nutrition Recommendations/Plan:   Continue current diet.  Send Ensure Compact TID. Gave examples of other ONS to use at home to boost her energy such as Boost, Premier Protein Shake, CIB)   Consider Vitamin  B12 & D level as appropriate.   Consider MVI as appropriate.     Malnutrition Assessment:  Malnutrition Status:  Moderate malnutrition (05/01/24 1456)    Context:  Chronic Illness     Findings of the 6 clinical characteristics of malnutrition:  Energy Intake:  Mild decrease in energy intake (Comment) (75% or less x 3 weeks per )  Weight Loss:  No significant weight loss     Body Fat Loss:  Mild body fat loss Orbital, Fat Overlying Ribs   Muscle Mass Loss:  Mild muscle mass loss Temples (temporalis), Clavicles (pectoralis & deltoids), Hand (interosseous)  Fluid Accumulation:  No significant fluid accumulation     Strength:  Not Performed    Nutrition Assessment:     Pt. moderately malnourished AEB criteria as listed above.  At risk for further nutrition compromise r/t Suicidal Ideation ( tried to strangle herself)         Nutrition Related Findings:    Pt. Report/Treatments/Miscellaneous: Pt seen, appears thin, mentions her nerves cause her to have poor intake. Has been eating less than 75% her usual intake atleast 2 weeks. Amenable to trying ensure compact TID.   GI Status: BMx 2 (5/1)  Pertinent Labs: (5/1) K+ 3.2, (4/30) AST 65, Total Bilirubin 1.4  Pertinent Meds: Prozac     Wound Type: None       Current Nutrition Intake & Therapies:    Average Meal Intake: 1-25%, 26-50%, 51-75%  Average Supplements Intake:  (new)  ADULT DIET; Regular; Safety Tray; Safety Tray (Disposables)  ADULT ORAL NUTRITION SUPPLEMENT; Breakfast, Lunch, Dinner; Standard 4 oz Oral Supplement    Anthropometric Measures:  Height: 149.9 cm (4' 11\")  Ideal Body Weight (IBW): 95 lbs (43 kg)  
Daily Progress Note  Kevin Dunn MD  5/2/2024    Reviewed patient's current plan of care and vital signs with nursing staff.  Patient seen with her .  Her  reported that he has been looking for a 30-day placement for patient due to depressive symptoms.  I had a long discussion with him.  He was not aware that 30-day stay is usually for rehab.  He is agreeable for patient to be transferred to our psychiatric unit.    SUBJECTIVE:    Patient is feeling better.  denies suicidal ideation.  ROS: Patient has new complaints:  No  Sleeping adequately:  Yes      Mental Status Examination:  Patient is cooperative. Speech: Normal rate and tone.  No abnormal movements, tics or mannerisms.  Mood dysthymic; affect restricted. Suicidal ideation Absent.  Homicidal ideations Absent.   Hallucinations Absent.  Delusions Absent. Thought Content: normal. Thought Processes: Goal-Directed. Alert and oriented X 3.  Attention and concentration fair. MEMORY intact.  Insight and Judgement limited.      Medications  Current Facility-Administered Medications: FLUoxetine (PROZAC) capsule 40 mg, 40 mg, Oral, Daily  0.9 % sodium chloride infusion, , IntraVENous, Continuous  muscle rub cream, , Topical, PRN  ALPRAZolam (XANAX) tablet 0.5 mg, 0.5 mg, Oral, TID PRN  sodium chloride flush 0.9 % injection 5-40 mL, 5-40 mL, IntraVENous, 2 times per day  sodium chloride flush 0.9 % injection 5-40 mL, 5-40 mL, IntraVENous, PRN  0.9 % sodium chloride infusion, , IntraVENous, PRN  potassium chloride (KLOR-CON M) extended release tablet 40 mEq, 40 mEq, Oral, PRN **OR** potassium bicarb-citric acid (EFFER-K) effervescent tablet 40 mEq, 40 mEq, Oral, PRN **OR** potassium chloride 10 mEq/100 mL IVPB (Peripheral Line), 10 mEq, IntraVENous, PRN  magnesium sulfate 2000 mg in 50 mL IVPB premix, 2,000 mg, IntraVENous, PRN  enoxaparin Sodium (LOVENOX) injection 30 mg, 30 mg, SubCUTAneous, Daily  ondansetron (ZOFRAN-ODT) disintegrating tablet 4 mg, 4 mg, 
Evaluated patient for [facility] due to self-pay status.    Patient is not eligible for [facility] because she has active prescription insurance.    Thank you!    Natalie Tiwari, [facility] - Prescription Assistance ([phone]) 5/2/2024,12:44 PM    
[facility] CRISIS ASSESSMENT    Chief Complaint:   Suicidal      Provisional Diagnosis: Unspecified depressive disorder     Risk, Psychosocial and Contextual Factors: (homeless, lack of social support etc.): psych history, recent psych admission, recent suicide attempt     Current  Treatment: [facility] per family      Present Suicidal Behavior:  VALERIE     Verbal:  VALERIE    Attempt:  VALERIE    Access to Weapons:   Denied    C-SSRS Current Suicide Risk: Low, Moderate or High:    High    Past Suicidal Behavior:   Yes    Verbal: Yes    Attempts: Yes    Self-Injurious/Self-Mutilation: (Specify)   Denied     Traumatic Event Within Past 2 Weeks: (Specify)  Denied    Current Abuse:  (Specify)  Denied     Legal: (Specify)   Denied    Violence: (Specify)   See chart    Protective Factors:  family support, connection to services     Housing:   Patient lives with her      CPAP/Oxygen/Ambulation Difficulties:   See chart     Critical Labs:   See chart     Clinical Summary:    Patient is a 58 year old female who presents to the ED on [facility].   Family with patient at bedside stating they have no idea what happened to her because they do not have a release [facility] would not disclose any information to them. Family reports that patient was seen for ingestion on Saturday and was seen by [facility]. [facility] admitted patient to the [facility]. Family called the CSU today and was told they would need to follow up with . Daughter would like  to follow up as she believe she does have a release of information on file.  to call.    Consulted with [facility] as patient is unable to complete assessment at this time. Patient is not answering basic questions. Patient is staring at the ceiling.   Alysia reports that patient tied a drawstring from her pants around her neck and was found during hourly rounding. Patient's feet were turned under. She reports that the string had to be cut off 
[facility]  INPATIENT PHYSICAL THERAPY  EVALUATION  [facility] ICU STEPDOWN TELEMETRY 4K - 4K-01/001-A    Time In: 1128  Time Out: 1138  Timed Code Treatment Minutes: 0 Minutes  Minutes: 10          Date: 2024  Patient Name: Nyasia Bella,  Gender:  female        MRN: [id number]  : 1965  (58 y.o.)      Referring Practitioner: Tigist Silva APRN - CNP  Diagnosis: Suicidal ideation  Additional Pertinent Hx: The patient was brought to [facility] due to suicide attempt.  According to record, she tried to strangle herself with anything she could get her hands on at home.  In the emergency room, she was not answering questions.  She reports she has been feeling depressed for about 2 weeks and having suicidal thoughts for the past week.  She was admitted to the psychiatric unit from  to  due to depression and suicidal thoughts.  She says since her discharge she went to a psychiatric provider at [facility] in [place].  She believes that some of her psychotropics were changed.  At any rate, she has been feeling more depressed past few weeks and she has been having suicidal thoughts for the past week.  She admits that she was trying to strangle herself.     Restrictions/Precautions:  Restrictions/Precautions: Fall Risk    Subjective:  Chart Reviewed: Yes  Patient assessed for rehabilitation services?: Yes  Family / Caregiver Present: Yes (sitter and daughter)  Subjective: RN approved session, pt is seated EOB with sitter present, daughter arrives at beginning of session.    General:  Overall Orientation Status: Within Functional Limits  Vision: Within Functional Limits  Hearing: Within functional limits       Pain: denies    Vitals: Vitals not assessed per clinical judgement, see nursing flowsheet    Social/Functional History:    Lives With: Spouse  Type of Home: House  Home Layout: Two level, Bed/Bath upstairs     Ambulation Assistance: 
Medication list at [facility] per patient:    40mg Fluoxetine daily    25mg Seroquel daily prn sleep    0.5mg Xanax TID prn anxiety    Tenex 1mg BID prn anxiety    Ambien 10mg nightly prn sleep      
Patient discharged to  in stable condition with all patient belongings. Patient and patient's  updated on the plan of care. Voluntary consent signed by the patient. Patient escorted by [place] [employer] at this time.  
Pharmacy Medication History Note      List of current medications patient is taking is complete.    Source of information: pharmacy dispense report    Changes made to medication list:  Medications removed (include reason, ex. therapy complete or physician discontinued):  No change    Medications added/doses adjusted:  Added guanfacine 1 mg- 0.5-1 tablet 2 times daily as needed for anxiety  Added quetiapine 25 mg- 1 tablet daily as needed for sleep    Other notes (ex. Recent course of antibiotics, Coumadin dosing):  Denies use of other OTC or herbal medications.    Allergies reviewed    Electronically signed by John Medina on 4/30/2024 at 10:36 AM                                                     
Pt was in bed as a sitter was with her. She was dealing with suicidal ideation. She only waned prayer and she got it. It was appreciated.    04/30/24 1034   Encounter Summary   Encounter Overview/Reason Initial Encounter   Service Provided For Patient   Referral/Consult From Nurse   Last Encounter  04/30/24   Complexity of Encounter Low   Begin Time 0733   End Time  0739   Total Time Calculated 6 min   Spiritual/Emotional needs   Type Spiritual Support   Assessment/Intervention/Outcome   Assessment Anxious   Intervention Empowerment       
Daily PRN  acetaminophen (TYLENOL) tablet 650 mg, 650 mg, Oral, Q6H PRN **OR** acetaminophen (TYLENOL) suppository 650 mg, 650 mg, Rectal, Q6H PRN  zolpidem (AMBIEN) tablet 10 mg, 10 mg, Oral, Nightly PRN  diphenhydrAMINE (BENADRYL) injection 25 mg, 25 mg, IntraVENous, Q6H PRN  famotidine (PEPCID) 20 mg in sodium chloride (PF) 0.9 % 10 mL injection, 20 mg, IntraVENous, BID  [COMPLETED] piperacillin-tazobactam (ZOSYN) 4,500 mg in sodium chloride 0.9 % 100 mL IVPB (mini-bag), 4,500 mg, IntraVENous, Once **FOLLOWED BY** piperacillin-tazobactam (ZOSYN) 3,375 mg in sodium chloride 0.9 % 50 mL IVPB (mini-bag), 3,375 mg, IntraVENous, Q8H    ASSESSMENT AND PLAN  Patient's symptoms are improving some  Continue with current psychotropics.  Attempt to develop insight  Psycho-education conducted.  Supportive Therapy conducted.  Transfer to  when medically stable         
04/30/24  0356 04/30/24  1005   CKTOTAL 1,400* 1,550* 2,200*       Urinalysis:      Lab Results   Component Value Date/Time    NITRU NEGATIVE 04/01/2024 09:02 PM    WBCUA 2-4 04/01/2024 09:02 PM    BACTERIA NONE SEEN 04/01/2024 09:02 PM    RBCUA 3-5 04/01/2024 09:02 PM    BLOODU NEGATIVE 04/01/2024 09:02 PM    GLUCOSEU NEGATIVE 04/01/2024 09:02 PM       Radiology:  US HEAD NECK SOFT TISSUE THYROID   Final Result   Impression:      Complex heterogeneous 3.4 x 3.8 cm solid nodule right lobe of thyroid      Recommend FNA      This document has been electronically signed by: Yinka Jean MD on    04/30/2024 04:21 AM      CT ABDOMEN PELVIS WO CONTRAST Additional Contrast? None   Final Result   Impression:   1. No acute intra-abdominal process.      This document has been electronically signed by: Abigail Olmos MD on    04/29/2024 10:57 PM      All CTs at this facility use dose modulation techniques and iterative    reconstructions, and/or weight-based dosing   when appropriate to reduce radiation to a low as reasonably achievable.      CT SOFT TISSUE NECK WO CONTRAST   Final Result   Impression:   1. No acute process.   2. 3.5 cm heterogeneous lesion involving the right lobe of the thyroid    gland which deviates the trachea to the left. Nonemergent thyroid    ultrasound can be performed for further evaluation.   3. 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      CT HEAD WO CONTRAST   Final Result   Impression:   1. No acute intracranial hemorrhage or process identified.      This document has been electronically signed by: Abigail Olmos MD on    04/29/2024 08:42 PM      All CTs at this facility use dose modulation

## 2024-05-02 NOTE — CARE COORDINATION
5/2/24, 11:50 AM EDT    DISCHARGE ON GOING EVALUATION  [facility] day: 3  Location: -01/001-A Reason for admit: Suicidal ideation [R45.851]  Suicide attempt (HCC) [T14.91XA]  Metabolic acidosis [E87.20]  Depression, unspecified depression type [F32.A]   Barriers to Discharge:   Suicidal Ideation/Traumatic Rhabdomyolysis  History: Alcohol Use  IVF, IV PPI  Await Family Decision re: Psychiatry Services  PCP: Sri Powers APRN - CNP  Readmission Risk Score: 11.7  Patient Goals/Plan/Treatment Preferences:   lives w spouse Manpreet who transports; sitter in room, self-pay status; Public Benefits following; monitor med assist, current w [facility]; monitor [facility]; await family decision

## 2024-05-02 NOTE — PROGRESS NOTES
Behavioral Health   Admission Note   Admission Type: Voluntary    Reason for Admission: \"I had a mental breakdown. I don't even know what happened. I've never even experienced anything like this.\"    Patient Strengths/Barriers  Strengths (Must Choose Two): Support from friends, Support from family  Barriers: Support of organized community    Addictive Behavior  In the Past 3 Months, Have You Felt or Has Someone Told You That You Have a Problem With  : None    Medical Problems:   Past Medical History:   Diagnosis Date    PFO (patent foramen ovale)     congential and closed    PONV (postoperative nausea and vomiting)        Status EXAM:  Mental Status and Behavioral Exam  Normal: No  Level of Assistance: Independent/Self  Facial Expression: Flat, Sad, Worried  Affect: Appropriate  Level of Consciousness: Alert  Frequency of Checks: 4 times per hour, close  Mood:Normal: No  Mood: Depressed, Helpless  Motor Activity:Normal: No  Eye Contact: Poor  Observed Behavior: Withdrawn, Tearful  Sexual Misconduct History: Current - no  Preception: [place] to time, [place] to person, [place] to place  Attention:Normal: No  Thought Processes: Blocking, Circumstantial  Thought Content:Normal: No  Thought Content: Poverty of content  Depression Symptoms: Impaired concentration, Feelings of hopelessess, Change in energy level, Feelings of helplessness  Anxiety Symptoms: Generalized  Mary Symptoms: No problems reported or observed.  Hallucinations: None (Patient denies)  Delusions: No  Memory:Normal: No  Memory: Poor remote, Poor recent  Insight and Judgment: No  Insight and Judgment: Poor judgment, Poor insight    Pt admitted with followings belongings:        Admission order obtained Yes  Belongings sent home with  NA. Valuables placed in safe in security envelope, number:  NA. Patient's home medications were NA.  Patient oriented to surroundings and program expectations and copy of patient rights given. Received admission packet:  Yes

## 2024-05-03 LAB
BASOPHILS ABSOLUTE: 0.1 THOU/MM3 (ref 0–0.1)
BASOPHILS NFR BLD AUTO: 0.6 %
DEPRECATED RDW RBC AUTO: 41.6 FL (ref 35–45)
EOSINOPHIL NFR BLD AUTO: 2.7 %
EOSINOPHILS ABSOLUTE: 0.2 THOU/MM3 (ref 0–0.4)
ERYTHROCYTE [DISTWIDTH] IN BLOOD BY AUTOMATED COUNT: 12.8 % (ref 11.5–14.5)
HCT VFR BLD AUTO: 41.2 % (ref 37–47)
HGB BLD-MCNC: 14.4 GM/DL (ref 12–16)
IMM GRANULOCYTES # BLD AUTO: 0.03 THOU/MM3 (ref 0–0.07)
IMM GRANULOCYTES NFR BLD AUTO: 0.3 %
LYMPHOCYTES ABSOLUTE: 3.1 THOU/MM3 (ref 1–4.8)
LYMPHOCYTES NFR BLD AUTO: 35.3 %
MCH RBC QN AUTO: 31.4 PG (ref 26–33)
MCHC RBC AUTO-ENTMCNC: 35 GM/DL (ref 32.2–35.5)
MCV RBC AUTO: 90 FL (ref 81–99)
MONOCYTES ABSOLUTE: 0.7 THOU/MM3 (ref 0.4–1.3)
MONOCYTES NFR BLD AUTO: 8.1 %
NEUTROPHILS ABSOLUTE: 4.7 THOU/MM3 (ref 1.8–7.7)
NEUTROPHILS NFR BLD AUTO: 53 %
NRBC BLD AUTO-RTO: 0 /100 WBC
PLATELET # BLD AUTO: 223 THOU/MM3 (ref 130–400)
PMV BLD AUTO: 10.3 FL (ref 9.4–12.4)
RBC # BLD AUTO: 4.58 MILL/MM3 (ref 4.2–5.4)
WBC # BLD AUTO: 8.9 THOU/MM3 (ref 4.8–10.8)

## 2024-05-03 PROCEDURE — 1240000000 HC EMOTIONAL WELLNESS R&B

## 2024-05-03 PROCEDURE — 6370000000 HC RX 637 (ALT 250 FOR IP): Performed by: PSYCHIATRY & NEUROLOGY

## 2024-05-03 PROCEDURE — 85025 COMPLETE CBC W/AUTO DIFF WBC: CPT

## 2024-05-03 PROCEDURE — 36415 COLL VENOUS BLD VENIPUNCTURE: CPT

## 2024-05-03 RX ADMIN — TRAZODONE HYDROCHLORIDE 50 MG: 50 TABLET ORAL at 21:37

## 2024-05-03 RX ADMIN — HYDROXYZINE HYDROCHLORIDE 50 MG: 25 TABLET, FILM COATED ORAL at 21:37

## 2024-05-03 RX ADMIN — FLUOXETINE 40 MG: 20 CAPSULE ORAL at 08:28

## 2024-05-03 RX ADMIN — GUANFACINE 1 MG: 1 TABLET ORAL at 08:28

## 2024-05-03 ASSESSMENT — PAIN SCALES - GENERAL
PAINLEVEL_OUTOF10: 0
PAINLEVEL_OUTOF10: 0

## 2024-05-03 NOTE — GROUP NOTE
Group Therapy Note    Date: 5/3/2024    Group Start Time: 1100  Group End Time: 1130  Group Topic: Healthy Living/Wellness    [facility] Adult Psych 4E    Sally Terry LPN        Group Therapy Note    Attendees: 4       Notes:  attended    Status After Intervention:  Improved    Participation Level: Active Listener    Participation Quality: Appropriate and Attentive      Speech:  normal      Thought Process/Content: Logical      Affective Functioning: Flat      Level of consciousness:  Alert, Oriented x4, and Attentive      Response to Learning: Able to verbalize current knowledge/experience, Able to verbalize/acknowledge new learning, Able to retain information, and Capable of insight      Endings: None Reported    Modes of Intervention: Education and Socialization      Discipline Responsible: nursing students      Signature:  Sally Terry LPN

## 2024-05-03 NOTE — PROGRESS NOTES
OQ Admission    Most Recent Score:    57    Baseline Score:   57    Change From Initial: No Reliable Change  Distress Level: Mild - Moderate       Graph Type:     Total  Most Recent Critical Item Status:  7.     Suicide - I have thoughts of ending my life. Rarely  11.     Substance Abuse - I use alcohol or a drug to get going in the morning. Never  20.     Substance Abuse - People criticize my drinking (or drug use). Never  24.     Substance Abuse - I have trouble at work/school or other daily activities because of drinking or drug use. Frequently

## 2024-05-03 NOTE — PROGRESS NOTES
Psychosocial Assessment    Current Level of Psychosocial Functioning     Independent   Dependent      XXX  Minimal Assist    XXX    Comments:      Psychosocial High Risk Factors (check all that apply)    Unable to obtain meds   Chronic illness/pain    Substance abuse   Lack of Family Support   Financial stress   Isolation   Inadequate Community Resources  Suicide attempt(s)  Not taking medications   Victim of crime   Developmental Delay  Unable to manage personal needs    Age 65 or older   Homeless  No transportation   Readmission within 30 days  Unemployment  Traumatic Event    Family/Supports identified:   Family, Friends    Sexual Orientation:      Heterosexual    Patient Strengths:    Stable home, good support, seeking help    Patient Barriers:     There are no significant barriers identified     Safety plan:     Contracts for safety    CMHC/ history:     XXX    Plan of Care:  medication management, group/individual therapies, family meetings, psycho -education, treatment team meetings to assist with stabilization    Initial Discharge Plan:  Pt is connected with [facility].     Clinical Summary:  Nyasia is a 58 year old female, who has a history of depression and anxiety. She was admitted to this unit after an attempt to strangle herself with her hands. Pt had recently been discharged from this unit on 04/04/24/ See prior assessment for historical details. Pt returned home however reports that her anxiety has not improved. She feels that it has increased and she is unsure as to why she tried to strangle herself. She does contract for safety. Pt also shares that she has been sleep walking and that this is not something she has a history of prior to her last admission. Pt is visibly anxious. Pt endorses having low self esteem. She is also observed to be depressed.

## 2024-05-03 NOTE — PROGRESS NOTES
Behavioral Health  Initial Interdisciplinary Treatment Plan NOTE    REVIEW DATE AND TIME: 05/03/24  1505    PATIENT was IN TREATMENT TEAM.  See Multidisciplinary Treatment Team sheet for participants.    ADMISSION TYPE:   Admission Type: Voluntary    REASON FOR ADMISSION:  Reason for Admission: \"I had a mental breakdown. I don't even know what happened. I've never even experienced anything like this.\"      Estimated Length of Stay Update:  05/04/24  Estimated Discharge Date Update: 3-5 days    Patient Strengths/Barriers  Strengths (Must Choose Two): Support from friends, Support from family  Barriers: Support of organized community  Addictive Behavior:Addictive Behavior  In the Past 3 Months, Have You Felt or Has Someone Told You That You Have a Problem With  : None  Medical Problems:  Past Medical History:   Diagnosis Date    PFO (patent foramen ovale)     congential and closed    PONV (postoperative nausea and vomiting)        EDUCATION:   Learner Progress Toward Treatment Goals: Reviewed results and recommendations of this team, Reviewed group plan and strategies, Reviewed signs, symptoms and risk of self harm and violent behavior, and Reviewed goals and plan of care    Method: Individual    Outcome: Verbalized understanding and Demonstrated Understanding    PATIENT GOALS: To be more social, improve self worth    OQ PRIORITIES IDENTIFIED BY THE PATIENT ON ADMISSION: (These are the symptoms that the patient has identified that are impacting them the most at the time of admission based on their own input on the OQ.  These priorities are to be included in all of their care while admitted.)        ND    PLAN/TREATMENT RECOMMENDATIONS UPDATE:   What is the most important thing we can help you with while you are here? See above  Who is your support system? Family  Do you have follow-up providers? [facility]  Do you have the ability to pay for your medications[facility]  Where will you be residing when you leave

## 2024-05-03 NOTE — PROGRESS NOTES
Daily Progress Note  Kevin Dunn MD  5/3/2024    Reviewed patient's current plan of care and vital signs with nursing staff.  Sleep:  6.5 hours last night  Attending groups: Yes  Patient was transferred yesterday from .  No reported Suicidal thought. Good interaction with peers & staff. Mood 3-5 on a scale of 1 to 10 with 10 is feeling normal.  I found out last night that patient was having sleepwalking from zolpidem per her .  I decided then to discontinue zolpidem and gave her another trial of trazodone.  She says in the past she does not sleep with trazodone while she slept 6-1/2 hours last night.    SUBJECTIVE:    Patient is feeling better. SUICIDAL IDEATION denies suicidal ideation.  Patient does not have medication side effects.    ROS: Patient has new complaints:  No  Sleeping adequately:  Yes  Visitors: Yes    Mental Status Examination:  Patient is cooperative. Speech: Normal rate and tone.  No abnormal movements, tics or mannerisms.  Mood sad; affect restricted. Suicidal ideation Absent.  Homicidal ideations Absent.   Hallucinations Absent.  Delusions Absent. Thought Content: normal. Thought Processes: Goal-Directed. Alert and oriented X 3.  Attention and concentration Fair. MEMORY intact.  Insight and Judgement limited.      Data   height is 1.499 m (4' 11\") and weight is 51 kg (112 lb 8 oz). Her oral temperature is 97.7 °F (36.5 °C). Her blood pressure is 142/103 (abnormal) and her pulse is 93. Her respiration is 18 and oxygen saturation is 97%.   Labs:   Admission on 05/02/2024   Component Date Value Ref Range Status    WBC 05/03/2024 8.9  4.8 - 10.8 thou/mm3 Final    RBC 05/03/2024 4.58  4.20 - 5.40 mill/mm3 Final    Hemoglobin 05/03/2024 14.4  12.0 - 16.0 gm/dl Final    Hematocrit 05/03/2024 41.2  37.0 - 47.0 % Final    MCV 05/03/2024 90.0  81.0 - 99.0 fL Final    MCH 05/03/2024 31.4  26.0 - 33.0 pg Final    MCHC 05/03/2024 35.0  32.2 - 35.5 gm/dl Final    RDW-CV 05/03/2024 12.8  11.5 - 14.5 %

## 2024-05-03 NOTE — PROGRESS NOTES
Group Therapy Note    Date: 5/3/2024  Start Time: 1330  End Time:  1415  Number of Participants: 5    Type of Group: Psychotherapy    Notes:  Pt is present for group with appropriate participation. Pt interacted well with peers. The group processed feelings of fear and the impact in which unfounded fears contributes to  their emotional distress and life struggles. Group members were introduced to basic CBT concepts and identified examples which challenged those unfounded fears.     Status After Intervention:  Improved    Participation Level: Active Listener and Interactive    Participation Quality: Appropriate, Attentive, Sharing, and Supportive      Speech:  normal      Thought Process/Content: Logical  Linear      Affective Functioning: Flat      Mood: depressed      Level of consciousness:  Alert, Oriented x4, and Attentive      Response to Learning: Able to verbalize current knowledge/experience, Able to verbalize/acknowledge new learning, Able to retain information, Capable of insight, Able to change behavior, and Progressing to goal      Endings: None Reported    Modes of Intervention: Education, Support, Socialization, Exploration, Clarifying, Problem-solving, and Activity      Discipline Responsible: /Counselor      Signature:  NADIA Shannon

## 2024-05-03 NOTE — PROGRESS NOTES
Comprehensive Nutrition Assessment    Type and Reason for Visit: Initial, Positive Nutrition Screen (MST score 2)    Nutrition Recommendations/Plan:   Continue diet as ordered.   Continue Ensure Plus TID and continue ONS at discharge.      Malnutrition Assessment:  Malnutrition Status: Moderate malnutrition  Context: Chronic Illness       Findings of the 6 clinical characteristics of malnutrition:  Energy Intake:  No significant decrease in energy intake  Weight Loss:  No significant weight loss     Body Fat Loss:  Mild body fat loss Orbital, Triceps   Muscle Mass Loss:  Mild muscle mass loss Temples (temporalis), Clavicles (pectoralis & deltoids), Hand (interosseous)  Fluid Accumulation:  Unable to assess     Strength:  Not Performed    Nutrition Assessment:    Pt. moderately malnourished AEB criteria as listed above. At risk for further nutrition compromise r/t admitted to IPP s/p suicide attempt. Noted underlying medical condition (PMHx PFO, PONV).    Nutrition Related Findings:    Patient/ Family Comments: Per patient her appetite has been pretty good recently. She denies any recent weight loss. She denies any nausea/ vomiting, but is having some diarrhea. Per patient her  typically cooks and she eats lunch and dinner every day. She is not a big breakfast person. Per patient she usually drinks 1 ONS from [employer] daily. RD encouraged patient to continue ONS at discharge. Patient likes the Ensure that she has been receiving.   Edema: generalized edema,per flowsheet  GI Function: LBM  5/1  per flowsheet  Wound: None      Labs:   No results found for: \"GLUF\", \"LABA1C\"  Recent Labs     04/30/24  1005 05/01/24  0350    144   K 3.2* 3.2*    108   GLUCOSE 130* 97   BUN 8 5*   CREATININE 0.7 0.7   MG  --  1.7     Medications: fluoxetine     Current Nutrition Intake & Therapies:    Recent PO intake: %  Recent Supplement Intake: % (per pt she really likes the Ensure)    - Current intake

## 2024-05-04 PROCEDURE — 6370000000 HC RX 637 (ALT 250 FOR IP): Performed by: PSYCHIATRY & NEUROLOGY

## 2024-05-04 PROCEDURE — 1240000000 HC EMOTIONAL WELLNESS R&B

## 2024-05-04 RX ORDER — FLUOXETINE HYDROCHLORIDE 20 MG/1
20 CAPSULE ORAL ONCE
Status: COMPLETED | OUTPATIENT
Start: 2024-05-04 | End: 2024-05-04

## 2024-05-04 RX ORDER — TRAZODONE HYDROCHLORIDE 100 MG/1
100 TABLET ORAL NIGHTLY PRN
Status: DISCONTINUED | OUTPATIENT
Start: 2024-05-04 | End: 2024-05-06 | Stop reason: HOSPADM

## 2024-05-04 RX ORDER — FLUOXETINE HYDROCHLORIDE 20 MG/1
60 CAPSULE ORAL DAILY
Status: DISCONTINUED | OUTPATIENT
Start: 2024-05-05 | End: 2024-05-06 | Stop reason: HOSPADM

## 2024-05-04 RX ADMIN — TRAZODONE HYDROCHLORIDE 100 MG: 100 TABLET ORAL at 21:22

## 2024-05-04 RX ADMIN — HYDROXYZINE HYDROCHLORIDE 50 MG: 25 TABLET, FILM COATED ORAL at 08:16

## 2024-05-04 RX ADMIN — FLUOXETINE 40 MG: 20 CAPSULE ORAL at 08:17

## 2024-05-04 RX ADMIN — HYDROXYZINE HYDROCHLORIDE 50 MG: 25 TABLET, FILM COATED ORAL at 21:22

## 2024-05-04 RX ADMIN — FLUOXETINE 20 MG: 20 CAPSULE ORAL at 14:55

## 2024-05-04 ASSESSMENT — PAIN SCALES - GENERAL
PAINLEVEL_OUTOF10: 0
PAINLEVEL_OUTOF10: 0

## 2024-05-04 NOTE — PROGRESS NOTES
This RN has reviewed and agrees with Laura TRIPP LPN's data collection and has collaborated with this LPN regarding the patient's care plan.

## 2024-05-04 NOTE — PROGRESS NOTES
Daily Progress Note  Kevin Dunn MD  5/4/2024    Reviewed patient's current plan of care and vital signs with nursing staff.  Sleep:  6.5 hours last night  Attending groups: Yes  No reported Suicidal thought. Good interaction with peers & staff. Mood 8 on a scale of 1 to 10 with 10 is feeling normal.  She had a crying spell episode earlier since she is missing one of her granddaughter's birthday party.  She admits that she is having a tough time today.    SUBJECTIVE:    Patient is feeling better. SUICIDAL IDEATION denies suicidal ideation.  Patient does not have medication side effects.    ROS: Patient has new complaints:  No  Sleeping adequately:  Yes  Visitors: Yes    Mental Status Examination:  Patient is cooperative. Speech: Normal rate and tone.  No abnormal movements, tics or mannerisms.  Mood sad; affect restricted. Suicidal ideation Absent.  Homicidal ideations Absent.   Hallucinations Absent.  Delusions Absent. Thought Content: normal. Thought Processes: Goal-Directed. Alert and oriented X 3.  Attention and concentration Fair. MEMORY intact.  Insight and Judgement limited.      Data   height is 1.499 m (4' 11\") and weight is 51 kg (112 lb 8 oz). Her oral temperature is 98.2 °F (36.8 °C). Her blood pressure is 152/83 (abnormal) and her pulse is 91. Her respiration is 16 and oxygen saturation is 99%.   Labs:            Medications  Current Facility-Administered Medications: acetaminophen (TYLENOL) tablet 650 mg, 650 mg, Oral, Q4H PRN  ibuprofen (ADVIL;MOTRIN) tablet 400 mg, 400 mg, Oral, Q6H PRN  polyethylene glycol (GLYCOLAX) packet 17 g, 17 g, Oral, Daily PRN  FLUoxetine (PROZAC) capsule 40 mg, 40 mg, Oral, Daily  guanFACINE (TENEX) tablet 1 mg, 1 mg, Oral, BID PRN  traZODone (DESYREL) tablet 50 mg, 50 mg, Oral, Nightly PRN  hydrOXYzine HCl (ATARAX) tablet 50 mg, 50 mg, Oral, TID PRN    ASSESSMENT  Severe episode of recurrent major depressive disorder, without psychotic features (HCC)     PLAN  Patient's

## 2024-05-04 NOTE — PROGRESS NOTES
Goal Wrap-Up/Relaxation Group    Date: 5/4/2024  Start Time: 2000  End Time:  2020    Type of Group: Goal Wrap Up    States that goal today was: \"Shower and see family\"    Goal for today was Met    Patient Participated in group/activities appropriately      Signature: Arthur Ware RN

## 2024-05-05 LAB
BACTERIA BLD AEROBE CULT: NORMAL
BACTERIA BLD AEROBE CULT: NORMAL

## 2024-05-05 PROCEDURE — 1240000000 HC EMOTIONAL WELLNESS R&B

## 2024-05-05 PROCEDURE — 6370000000 HC RX 637 (ALT 250 FOR IP): Performed by: PSYCHIATRY & NEUROLOGY

## 2024-05-05 RX ADMIN — HYDROXYZINE HYDROCHLORIDE 50 MG: 25 TABLET, FILM COATED ORAL at 21:55

## 2024-05-05 RX ADMIN — FLUOXETINE 60 MG: 20 CAPSULE ORAL at 08:22

## 2024-05-05 RX ADMIN — TRAZODONE HYDROCHLORIDE 100 MG: 100 TABLET ORAL at 21:55

## 2024-05-05 ASSESSMENT — PAIN - FUNCTIONAL ASSESSMENT: PAIN_FUNCTIONAL_ASSESSMENT: ACTIVITIES ARE NOT PREVENTED

## 2024-05-05 ASSESSMENT — PAIN SCALES - GENERAL
PAINLEVEL_OUTOF10: 0
PAINLEVEL_OUTOF10: 0

## 2024-05-05 NOTE — PROGRESS NOTES
Goal Wrap-Up/Relaxation Group    Date: 5/4/2024  Start Time: 2000  End Time:  2020    Type of Group: Goal Wrap Up    States that goal today was: \"shower\"      Goal for today was Met    Patient Participated in group/activities appropriately      Signature: Arthur Ware RN

## 2024-05-05 NOTE — PROGRESS NOTES
This RN has reviewed and agrees with Carroll TRIPP LPN's data collection and has collaborated with this LPN regarding the patient's care plan.

## 2024-05-05 NOTE — PROGRESS NOTES
Daily Progress Note  eKvin Dunn MD  5/5/2024    Reviewed patient's current plan of care and vital signs with nursing staff.  Sleep:  8 hours last night broken  Attending groups: Yes  No reported Suicidal thought. Good interaction with peers & staff. Mood 9 on a scale of 1 to 10 with 10 is feeling normal, no crying spells.  She is hopeful for the future.    SUBJECTIVE:    Patient is feeling better. SUICIDAL IDEATION denies suicidal ideation.  Patient does not have medication side effects.    ROS: Patient has new complaints:  No  Sleeping adequately:  Yes  Visitors: Yes    Mental Status Examination:  Patient is cooperative. Speech: Normal rate and tone.  No abnormal movements, tics or mannerisms.  Mood euthymic; affect restricted. Suicidal ideation Absent.  Homicidal ideations Absent.   Hallucinations Absent.  Delusions Absent. Thought Content: normal. Thought Processes: Goal-Directed. Alert and oriented X 3.  Attention and concentration Fair. MEMORY intact.  Insight and Judgement limited.      Data   height is 1.499 m (4' 11\") and weight is 51 kg (112 lb 8 oz). Her oral temperature is 98.4 °F (36.9 °C). Her blood pressure is 126/83 and her pulse is 82. Her respiration is 16 and oxygen saturation is 99%.   Labs:            Medications  Current Facility-Administered Medications: FLUoxetine (PROZAC) capsule 60 mg, 60 mg, Oral, Daily  traZODone (DESYREL) tablet 100 mg, 100 mg, Oral, Nightly PRN  acetaminophen (TYLENOL) tablet 650 mg, 650 mg, Oral, Q4H PRN  ibuprofen (ADVIL;MOTRIN) tablet 400 mg, 400 mg, Oral, Q6H PRN  polyethylene glycol (GLYCOLAX) packet 17 g, 17 g, Oral, Daily PRN  guanFACINE (TENEX) tablet 1 mg, 1 mg, Oral, BID PRN  hydrOXYzine HCl (ATARAX) tablet 50 mg, 50 mg, Oral, TID PRN    ASSESSMENT  Severe episode of recurrent major depressive disorder, without psychotic features (HCC)     PLAN  Patient's symptoms are improving   Continue with current medications.  Attempt to develop insight  Psycho-education

## 2024-05-05 NOTE — GROUP NOTE
Group Therapy Note    Date: 5/5/2024    Group Start Time: 0900  Group End Time: 0945  Group Topic: Community Meeting    [facility] 4E    Juanis Mead        Group Therapy Note    Attendees: Patients shared their goals & discussed concerns on the unit.       Patient's Goal:  To not spend the day in my room.    Notes:  Patient shared her goal & participated in today's discussion.    Status After Intervention:  Improved    Participation Level: Active Listener and Interactive    Participation Quality: Appropriate, Attentive, and Sharing      Speech:  normal      Thought Process/Content: Linear      Affective Functioning: Congruent      Mood: euthymic      Level of consciousness:  Alert and Attentive      Response to Learning: Able to verbalize current knowledge/experience, Able to verbalize/acknowledge new learning, Able to change behavior, and Progressing to goal      Endings: None Reported    Modes of Intervention: Education and Support      Discipline Responsible: Behavorial Health Tech      Signature:  Juanis Mead

## 2024-05-06 VITALS
DIASTOLIC BLOOD PRESSURE: 88 MMHG | BODY MASS INDEX: 22.68 KG/M2 | SYSTOLIC BLOOD PRESSURE: 126 MMHG | OXYGEN SATURATION: 98 % | HEIGHT: 59 IN | WEIGHT: 112.5 LBS | RESPIRATION RATE: 16 BRPM | HEART RATE: 83 BPM | TEMPERATURE: 99.1 F

## 2024-05-06 PROCEDURE — 6370000000 HC RX 637 (ALT 250 FOR IP): Performed by: PSYCHIATRY & NEUROLOGY

## 2024-05-06 PROCEDURE — 5130000000 HC BRIDGE APPOINTMENT

## 2024-05-06 RX ORDER — HYDROXYZINE 50 MG/1
50 TABLET, FILM COATED ORAL 3 TIMES DAILY PRN
Qty: 90 TABLET | Refills: 0 | Status: SHIPPED | OUTPATIENT
Start: 2024-05-06 | End: 2024-06-05

## 2024-05-06 RX ORDER — FLUOXETINE HYDROCHLORIDE 20 MG/1
60 CAPSULE ORAL DAILY
Qty: 90 CAPSULE | Refills: 0 | Status: SHIPPED | OUTPATIENT
Start: 2024-05-07

## 2024-05-06 RX ORDER — TRAZODONE HYDROCHLORIDE 100 MG/1
100 TABLET ORAL NIGHTLY PRN
Qty: 30 TABLET | Refills: 0 | Status: SHIPPED | OUTPATIENT
Start: 2024-05-06

## 2024-05-06 RX ADMIN — FLUOXETINE 60 MG: 20 CAPSULE ORAL at 07:48

## 2024-05-06 RX ADMIN — HYDROXYZINE HYDROCHLORIDE 50 MG: 25 TABLET, FILM COATED ORAL at 07:48

## 2024-05-06 NOTE — PROGRESS NOTES
Behavioral Health Transition Record to Provider    Patient Name: Nyasia Bella  YOB: 1965   Medical Record Number: [id number]  Date of Admission: 5/2/2024  4:09 PM   Date of Discharge: 5-6-2024    Attending Provider: Kevin Dunn MD   Discharging Provider: Kevin Dunn  To contact this individual call [phone] and ask the  to page.  If unavailable, ask to be transferred to Behavioral Health Provider on call.  A Behavioral Health Provider will be available on call 24/7 and during holidays.    Primary Care Provider: Sri Powers APRN - CNP    Allergies   Allergen Reactions    Doxycycline Nausea And Vomiting       Reason for Admission: MDD    Admission Diagnosis: Major depressive disorder [F32.9]  MDD (major depressive disorder), recurrent episode, mild (HCC) [F33.0]    * No surgery found *    Results for orders placed or performed during the hospital encounter of 05/02/24   CBC auto differential   Result Value Ref Range    WBC 8.9 4.8 - 10.8 thou/mm3    RBC 4.58 4.20 - 5.40 mill/mm3    Hemoglobin 14.4 12.0 - 16.0 gm/dl    Hematocrit 41.2 37.0 - 47.0 %    MCV 90.0 81.0 - 99.0 fL    MCH 31.4 26.0 - 33.0 pg    MCHC 35.0 32.2 - 35.5 gm/dl    RDW-CV 12.8 11.5 - 14.5 %    RDW-SD 41.6 35.0 - 45.0 fL    Platelets 223 130 - 400 thou/mm3    MPV 10.3 9.4 - 12.4 fL    Seg Neutrophils 53.0 %    Lymphocytes 35.3 %    Monocytes % 8.1 %    Eosinophils 2.7 %    Basophils 0.6 %    Immature Granulocytes % 0.3 %    Neutrophils Absolute 4.7 1.8 - 7.7 thou/mm3    Lymphocytes Absolute 3.1 1.0 - 4.8 thou/mm3    Monocytes Absolute 0.7 0.4 - 1.3 thou/mm3    Eosinophils Absolute 0.2 0.0 - 0.4 thou/mm3    Basophils Absolute 0.1 0.0 - 0.1 thou/mm3    Immature Grans (Abs) 0.03 0.00 - 0.07 thou/mm3    nRBC 0 /100 wbc       Immunizations administered during this encounter:   Immunization History   Administered Date(s) Administered    COVID-19, MODERNA BLUE border, Primary or Immunocompromised, (age 12y+), IM, 100

## 2024-05-06 NOTE — DISCHARGE SUMMARY
Physician Discharge Summary     Patient ID:  Nyasia Bella  [id number]  58 y.o.  1965    Admit date: 5/2/2024    Discharge date and time: 5/6/2024  10:19 AM     Admitting Physician: Kevin Dunn MD     Discharge Physician: Kevin Dunn MD      Admission Diagnoses: Major depressive disorder [F32.9]  MDD (major depressive disorder), recurrent episode, mild (HCC) [F33.0]    IDENTIFYING INFORMATION: ***    HISTORY OF PRESENT ILLNESS: ***    MENTAL STATUS EXAMINATION AT ADMISSION: See H and P.    Discharge Diagnoses:   Severe episode of recurrent major depressive disorder, without psychotic features (HCC)     Past Medical History:   Diagnosis Date    PFO (patent foramen ovale)     congential and closed    PONV (postoperative nausea and vomiting)         Admission Condition: poor    Discharged Condition: stable    Indication for Admission: threat to self    Significant Diagnostic Studies:   See Results Review tab in EHR      TREATMENT AND CLINICAL COURSE:   Patient was admitted on the unit. Routine lab and physical examination were done on the medical floor.  While on the medical floor I resumed fluoxetine and zolpidem.  Upon admission to the psychiatric unit, I discontinued zolpidem and alprazolam; she was then given trazodone and hydroxyzine.  Later I increased fluoxetine and trazodone.  I found out that she was having sleepwalking from zolpidem. Patient was involved in group and milieu therapy. Although patient was suicidal upon admission, patient did not have suicidal thought during this hospital stay. Toward the end of the hospital stay, patient become more hopeful. Overall, hospital stay was uncomplicated, and patient was discharged in stable condition.    Consults: none    Treatments: Psychotropic medications, therapy with group, milieu, and 1:1 with nurses, social workers and Attending physician.      Discharge Medications:  Current Discharge Medication List        START taking these medications

## 2024-05-06 NOTE — PLAN OF CARE
Problem: Anxiety  Goal: Will report anxiety at manageable levels  Description: INTERVENTIONS:  1. Administer medication as ordered  2. Teach and rehearse alternative coping skills  3. Provide emotional support with 1:1 interaction with staff  5/5/2024 1136 by Laura Molina LPN  Outcome: Not Progressing  Flowsheets (Taken 5/5/2024 0948)  Will report anxiety at manageable levels:   Administer medication as ordered   Teach and rehearse alternative coping skills   Provide emotional support with 1:1 interaction with staff  Note: Patient is endorsing some anxiety at this point in the shift, but is declining any PRN medication at this point in the shift   5/4/2024 2232 by Arthur Ware, RN  Outcome: Progressing  Flowsheets (Taken 5/4/2024 2232)  Will report anxiety at manageable levels:   Administer medication as ordered   Teach and rehearse alternative coping skills   Provide emotional support with 1:1 interaction with staff  Note: Pt reports anxiety at this time.     Problem: Depression  Goal: Will be euthymic at discharge  Description: INTERVENTIONS:  1. Administer medication as ordered  2. Provide emotional support via 1:1 interaction with staff  3. Encourage involvement in milieu/groups/activities  4. Monitor for social isolation  5/5/2024 1136 by Laura Molina LPN  Outcome: Progressing  Note: Patient denies any depression at this point in the shift   5/4/2024 2232 by Arthur Ware, RN  Outcome: Progressing  Note: Pt reports \"not now\" when asked about depression.     Problem: Behavior  Goal: Pt/Family maintain appropriate behavior and adhere to behavioral management agreement, if implemented  Description: INTERVENTIONS:  1. Assess patient/family's coping skills and  non-compliant behavior (including use of illegal substances)  2. Notify security of behavior or suspected illegal substances which indicate the need for search of the family and/or belongings  3. Encourage verbalization of thoughts and concerns in 
  Problem: Depression  Goal: Will be euthymic at discharge  Description: INTERVENTIONS:  1. Administer medication as ordered  2. Provide emotional support via 1:1 interaction with staff  3. Encourage involvement in milieu/groups/activities  4. Monitor for social isolation  5/4/2024 2232 by Arthur Ware RN  Outcome: Progressing  Note: Pt reports \"not now\" when asked about depression.  5/4/2024 1307 by Laura Molina LPN  Outcome: Progressing  Note: Patient is rating mood a 8/10 with 10 being the best and denies any depression at this point in the shift        Problem: Behavior  Goal: Pt/Family maintain appropriate behavior and adhere to behavioral management agreement, if implemented  Description: INTERVENTIONS:  1. Assess patient/family's coping skills and  non-compliant behavior (including use of illegal substances)  2. Notify security of behavior or suspected illegal substances which indicate the need for search of the family and/or belongings  3. Encourage verbalization of thoughts and concerns in a socially appropriate manner  4. Utilize positive, consistent limit setting strategies supporting safety of patient, staff and others  5. Encourage participation in the decision making process about the behavioral management agreement  6. If a visitor's behavior poses a threat to safety call refer to organization policy.  7. Initiate consult with , Psychosocial CNS, Spiritual Care as appropriate  5/4/2024 2232 by Arthur aWre RN  Outcome: Progressing  Flowsheets (Taken 5/4/2024 2232)  Patient/family maintains appropriate behavior and adheres to behavioral management agreement, if implemented: Encourage verbalization of thoughts and concerns in a socially appropriate manner  5/4/2024 1307 by Laura Molina LPN  Outcome: Progressing  Flowsheets (Taken 5/4/2024 0730)  Patient/family maintains appropriate behavior and adheres to behavioral management agreement, if implemented:   Encourage participation 
  Problem: Depression  Goal: Will be euthymic at discharge  Description: INTERVENTIONS:  1. Administer medication as ordered  2. Provide emotional support via 1:1 interaction with staff  3. Encourage involvement in milieu/groups/activities  4. Monitor for social isolation  Outcome: Progressing  Note: Rates mood a 3/10     Problem: Behavior  Goal: Pt/Family maintain appropriate behavior and adhere to behavioral management agreement, if implemented  Description: INTERVENTIONS:  1. Assess patient/family's coping skills and  non-compliant behavior (including use of illegal substances)  2. Notify security of behavior or suspected illegal substances which indicate the need for search of the family and/or belongings  3. Encourage verbalization of thoughts and concerns in a socially appropriate manner  4. Utilize positive, consistent limit setting strategies supporting safety of patient, staff and others  5. Encourage participation in the decision making process about the behavioral management agreement  6. If a visitor's behavior poses a threat to safety call refer to organization policy.  7. Initiate consult with , Psychosocial CNS, Spiritual Care as appropriate  Outcome: Progressing  Flowsheets  Taken 5/2/2024 2147 by Alysia Mac RN  Patient/family maintains appropriate behavior and adheres to behavioral management agreement, if implemented: Encourage verbalization of thoughts and concerns in a socially appropriate manner  Taken 5/2/2024 1715 by Oma Burgess RN  Patient/family maintains appropriate behavior and adheres to behavioral management agreement, if implemented: Assess patient/family’s coping skills and  non-compliant behavior (including use of illegal substances)     Problem: Anxiety  Goal: Will report anxiety at manageable levels  Description: INTERVENTIONS:  1. Administer medication as ordered  2. Teach and rehearse alternative coping skills  3. Provide emotional support with 1:1 
unit  5/5/2024 1136 by Laura Molina LPN  Outcome: Progressing  Flowsheets (Taken 5/5/2024 0948)  Patient/family maintains appropriate behavior and adheres to behavioral management agreement, if implemented: Encourage verbalization of thoughts and concerns in a socially appropriate manner  Note: Patient is able to maintain appropriate behavior at this point in the shift     Problem: Anxiety  Goal: Will report anxiety at manageable levels  Description: INTERVENTIONS:  1. Administer medication as ordered  2. Teach and rehearse alternative coping skills  3. Provide emotional support with 1:1 interaction with staff  5/5/2024 2120 by Chio Kwon, RN  Outcome: Progressing  Flowsheets (Taken 5/5/2024 0948 by Laura Molina LPN)  Will report anxiety at manageable levels:   Administer medication as ordered   Teach and rehearse alternative coping skills   Provide emotional support with 1:1 interaction with staff  Note: Pt reports anxiety is present but is related to \"thinking about discharge\" pt reports she will ask for prn antianxiety medication at bedtime  5/5/2024 1136 by Laura Molina LPN  Outcome: Not Progressing  Flowsheets (Taken 5/5/2024 0948)  Will report anxiety at manageable levels:   Administer medication as ordered   Teach and rehearse alternative coping skills   Provide emotional support with 1:1 interaction with staff  Note: Patient is endorsing some anxiety at this point in the shift, but is declining any PRN medication at this point in the shift      Problem: Discharge Planning  Goal: Discharge to home or other facility with appropriate resources  5/5/2024 2120 by Chio Kwon, RN  Outcome: Progressing  Flowsheets (Taken 5/5/2024 0948 by Laura Molina LPN)  Discharge to home or other facility with appropriate resources: Identify barriers to discharge with patient and caregiver  Note: Pt has completed her safety plan and is preparing for discharge  5/5/2024 1136 by 
and intervene to maintain adequate nutrition, hydration, elimination, sleep and activity  5/3/2024 1355 by Oma Burgess RN  Outcome: Progressing  Flowsheets (Taken 5/3/2024 0800)  Effect of thought disturbance (confusion, delirium, dementia, or psychosis) are managed with adequate functional status: Monitor and intervene to maintain adequate nutrition, hydration, elimination, sleep and activity  Note: Patient reports confusion. Reports having no recollection of what happened prior to admission.      Problem: Pain  Goal: Verbalizes/displays adequate comfort level or baseline comfort level  5/4/2024 0156 by Arthur Ware RN  Outcome: Progressing  Flowsheets (Taken 5/4/2024 0156)  Verbalizes/displays adequate comfort level or baseline comfort level:   Encourage patient to monitor pain and request assistance   Administer analgesics based on type and severity of pain and evaluate response   Assess pain using appropriate pain scale  5/3/2024 1355 by Oma Burgess RN  Outcome: Progressing  Flowsheets (Taken 5/2/2024 2147 by Alysia Mac, RN)  Verbalizes/displays adequate comfort level or baseline comfort level: Encourage patient to monitor pain and request assistance  Note: Denies pain.      Problem: Risk for Elopement  Goal: Patient will not exit the unit/facility without proper excort  5/4/2024 0156 by Arthur Ware RN  Outcome: Progressing  Flowsheets (Taken 5/4/2024 0156)  Nursing Interventions for Elopement Risk: Make sure patient has all necessary personal care items  Note: Patient has not been observed to be checking the exit doors or demonstrating behaviors of attempting to leave the unit.    5/3/2024 1355 by Oma Burgess RN  Outcome: Progressing  Flowsheets (Taken 5/2/2024 1715)  Nursing Interventions for Elopement Risk: Communicate to physician the risk for elopement  Note: Patient not leave unit this shift.      Problem: Nutrition Deficit:  Goal: Optimize nutritional status  5/4/2024 0156 by Arthur Ware 
refocusing and direction  4. Decrease environmental stimuli, including noise as appropriate  5. Monitor and intervene to maintain adequate nutrition, hydration, elimination, sleep and activity  6. If unable to ensure safety without constant attention obtain sitter and review sitter guidelines with assigned personnel  7. Initiate Psychosocial CNS and Spiritual Care consult, as indicated  Outcome: Progressing  Flowsheets (Taken 5/3/2024 0800)  Effect of thought disturbance (confusion, delirium, dementia, or psychosis) are managed with adequate functional status: Monitor and intervene to maintain adequate nutrition, hydration, elimination, sleep and activity  Note: Patient reports confusion. Reports having no recollection of what happened prior to admission.      Problem: Pain  Goal: Verbalizes/displays adequate comfort level or baseline comfort level  Outcome: Progressing  Flowsheets (Taken 5/2/2024 2147 by Alysia Mac RN)  Verbalizes/displays adequate comfort level or baseline comfort level: Encourage patient to monitor pain and request assistance  Note: Denies pain.      Problem: Risk for Elopement  Goal: Patient will not exit the unit/facility without proper excort  Outcome: Progressing  Flowsheets (Taken 5/2/2024 1715)  Nursing Interventions for Elopement Risk: Communicate to physician the risk for elopement  Note: Patient not leave unit this shift.      Problem: Nutrition Deficit:  Goal: Optimize nutritional status  Outcome: Progressing  Flowsheets (Taken 5/3/2024 1355)  Nutrient intake appropriate for improving, restoring, or maintaining nutritional needs: Monitor oral intake, labs, and treatment plans  Note: Patient eating adequate amounts to this point in the shift.      Problem: Discharge Planning  Goal: Discharge to home or other facility with appropriate resources  Outcome: Not Progressing  Flowsheets (Taken 5/3/2024 1355)  Discharge to home or other facility with appropriate resources: Identify 
0730)  Nursing Interventions for Elopement Risk:   Collaborate with family members/caregivers to mitigate the elopement risk   Assist with personal care needs such as toileting, eating, dressing, as needed to reduce the risk of wandering  5/4/2024 0156 by Arthur Ware RN  Outcome: Progressing  Flowsheets (Taken 5/4/2024 0156)  Nursing Interventions for Elopement Risk: Make sure patient has all necessary personal care items  Note: Patient has not been observed to be checking the exit doors or demonstrating behaviors of attempting to leave the unit.       Problem: Nutrition Deficit:  Goal: Optimize nutritional status  5/4/2024 1307 by Laura Molina LPN  Outcome: Progressing  Flowsheets (Taken 5/4/2024 0156 by Arthur Ware RN)  Nutrient intake appropriate for improving, restoring, or maintaining nutritional needs:   Assess nutritional status and recommend course of action   Monitor oral intake, labs, and treatment plans  5/4/2024 0156 by Arthur Ware RN  Outcome: Progressing  Flowsheets (Taken 5/4/2024 0156)  Nutrient intake appropriate for improving, restoring, or maintaining nutritional needs:   Assess nutritional status and recommend course of action   Monitor oral intake, labs, and treatment plans     Problem: Anxiety  Goal: Will report anxiety at manageable levels  Description: INTERVENTIONS:  1. Administer medication as ordered  2. Teach and rehearse alternative coping skills  3. Provide emotional support with 1:1 interaction with staff  5/4/2024 1307 by Laura Molina LPN  Outcome: Not Progressing  Flowsheets (Taken 5/4/2024 0730)  Will report anxiety at manageable levels:   Administer medication as ordered   Provide emotional support with 1:1 interaction with staff   Teach and rehearse alternative coping skills  Note: Patient is endorsing some anxiety at this point in the shift     5/4/2024 0156 by Arthur Ware RN  Outcome: Progressing  Flowsheets (Taken 5/4/2024 0156)  Will report anxiety at

## 2024-05-06 NOTE — PROGRESS NOTES
Care plan reviewed with patient.  Patient  verbalize understanding of the plan of care and contribute to goal setting.

## 2024-05-06 NOTE — DISCHARGE INSTRUCTIONS
Keep all follow-up appointments and take medications as directed.     Call the hope line if needed at :  [facility] [phone].  [facility] [phone].  [facility] [id number].  [facility] [phone].  [facility] [phone].  [facility] [phone]    Symptoms to report to your Doctor:  Depression  Inability to eat, sleep, or have a bowel movement  Increased sleepiness and lethargy  Voices in your head  Any thoughts of harming self or others    Things to avoid:  Caffeine  Alcohol  No street drugs  Over the counter medications unless Ok'd by your physician or pharmacist.  Driving or operating machinery until full effects of your medications are known.  Driving or operating machinery if dizzy or drowsy from medications.  Use journal as directed.    Education:  Illness and medication teaching was completed.    Discharge Disposition: Patient was discharged to home and was transported by private vehicle. Patient was accompanied by staff.      Information sent to next level of care:    ____Admission orders to Long Term Care    __X__Discharge instructions    ____Behavioral Services Assessment    ____Hand off Summary    ____History and Physical    ____Last dose MAR    ____Patient transfer form    ____Other

## 2024-05-06 NOTE — PROGRESS NOTES
Discharge planning 1029- Nyasia is to go to [place] for an appointment with Jennifer on Wednesday, May 8 at 4:00pm.

## 2024-05-06 NOTE — PROGRESS NOTES
Behavioral Health   Discharge Note    Pt discharged with followings belongings:   Dental Appliances: None  Vision - Corrective Lenses: Eyeglasses  Hearing Aid: None  Jewelry: None  Body Piercings Removed: N/A  Clothing: Shirt, Pants, Sweater  Other Valuables: Other (Comment) (Locked in 4E storage room)   Valuables retrieved from safe, security envelope number:  n/a and returned to patient.  Patient left department with staff .  Discharged to home. \"An Important Message from [employer] About Your Rights\" (IMM) form photocopy original from admission and provided to pt at least 4 hours prior to discharge N/A. \"An Important Message from [employer] About Your Rights\" (IMM) form photocopy original from admission. N/A. If pt left within 4 hours of receiving 2nd delivery of IMM, this is because pt was agreeable with hospital discharge.  Patient/guardian education on aftercare instructions: Yes  Bridge appointment completed:  yes.  Reviewed Discharge Instructions with patient/family/nursing facility.  Patient/family verbalizes understanding and agreement with the discharge plan using the teachback method.    Patient/family verbalize understanding of AVS:Yes    Status EXAM upon discharge:  Mental Status and Behavioral Exam  Normal: No  Level of Assistance: Independent/Self  Facial Expression: Brightened  Affect: Appropriate  Level of Consciousness: Alert  Frequency of Checks: 4 times per hour, close  Mood:Normal: No  Mood: Anxious  Motor Activity:Normal: Yes  Motor Activity: Other (comment) (WNL)  Eye Contact: Good  Observed Behavior: Cooperative  Sexual Misconduct History: Current - no  Preception: [place] to person, [place] to time, [place] to place, [place] to situation  Attention:Normal: No  Attention: Distractible  Thought Processes: Blocking  Thought Content:Normal: Yes  Thought Content: Preoccupations ('thinking about discharge\")  Depression Symptoms: No problems reported or observed.  Anxiety Symptoms:

## 2024-05-06 NOTE — PROGRESS NOTES
Daily Progress Note  Kevin Dunn MD  5/6/2024    Reviewed patient's current plan of care and vital signs with nursing staff.  Sleep:  8 hours last night   Attending groups: Yes  No reported Suicidal thought. Good interaction with peers & staff. Mood 10 on a scale of 1 to 10 with 10 is feeling normal, no crying spells.  She is hopeful for the future.  She feels ready for discharge.    SUBJECTIVE:    Patient is feeling better. SUICIDAL IDEATION: None.  Patient does not have medication side effects.    ROS: Patient has new complaints:  No  Sleeping adequately:  Yes  Visitors: Yes    Mental Status Examination:  Patient is cooperative. Speech: Normal rate and tone.  No abnormal movements, tics or mannerisms.  Mood euthymic; affect appropriate. Suicidal ideation Absent.  Homicidal ideations Absent.   Hallucinations Absent.  Delusions Absent. Thought Content: normal. Thought Processes: Goal-Directed. Alert and oriented X 3.  Attention and concentration Fair. MEMORY intact.  Insight and Judgement limited.      Data   height is 1.499 m (4' 11\") and weight is 51 kg (112 lb 8 oz). Her oral temperature is 99.1 °F (37.3 °C). Her blood pressure is 126/88 and her pulse is 83. Her respiration is 16 and oxygen saturation is 98%.   Labs:            Medications  Current Facility-Administered Medications: FLUoxetine (PROZAC) capsule 60 mg, 60 mg, Oral, Daily  traZODone (DESYREL) tablet 100 mg, 100 mg, Oral, Nightly PRN  acetaminophen (TYLENOL) tablet 650 mg, 650 mg, Oral, Q4H PRN  ibuprofen (ADVIL;MOTRIN) tablet 400 mg, 400 mg, Oral, Q6H PRN  polyethylene glycol (GLYCOLAX) packet 17 g, 17 g, Oral, Daily PRN  guanFACINE (TENEX) tablet 1 mg, 1 mg, Oral, BID PRN  hydrOXYzine HCl (ATARAX) tablet 50 mg, 50 mg, Oral, TID PRN    ASSESSMENT  Severe episode of recurrent major depressive disorder, without psychotic features (HCC)     PLAN  Patient's symptoms are improving   Continue with current medications.  Attempt to develop

## 2024-05-06 NOTE — GROUP NOTE
Group Therapy Note    Date: 5/6/2024    Group Start Time: 1100  Group End Time: 1145  Group Topic: Healthy Living/Wellness    [facility] 4E    Sally Terry LPN        Group Therapy Note    Attendees: 8       Notes:  did not attend    Signature:  Sally Terry LPN

## (undated) DEVICE — PACK PROCEDURE SURG POD SC SRHP LF

## (undated) DEVICE — GLOVE ORANGE PI 8   MSG9080

## (undated) DEVICE — SUTURE NONABSORBABLE MONOFILAMENT 4-0 PS-2 18 IN BLU PROLENE 8682H

## (undated) DEVICE — THIN OFFSET (9.0 X 0.38 X 25.0MM)

## (undated) DEVICE — PREP SOL PVP IODINE 4%  4 OZ/BTL

## (undated) DEVICE — SOLUTION IRRIG 1500ML STRL H2O USP POUR PLAS BTL

## (undated) DEVICE — SUTURE PROL SZ 4-0 L18IN NONABSORBABLE BLU L19MM PS-2 3/8 8682G

## (undated) DEVICE — SOLUTION SURG PREP POV IOD 7.5% 4 OZ

## (undated) DEVICE — NEEDLE HYPO 25GA L1IN PIVOTING SHLD FOR LUERLOCK SYR ECLIPSE

## (undated) DEVICE — GLOVE SURG SZ 8 L12IN FNGR THK94MIL TRNSLUC YEL LTX HYDRGEL

## (undated) DEVICE — SOLUTION IV IRRIG POUR BRL 0.9% SODIUM CHL 2F7124

## (undated) DEVICE — KIT DRL BIT DIA3MM LOC PIN TAMP K WIRE CORRESPONDING

## (undated) DEVICE — Device

## (undated) DEVICE — DRILL KIT 2 MM SPD

## (undated) DEVICE — PADDING UNDERCAST W4INXL4YD COT FBR LO LINTING WYTEX

## (undated) DEVICE — IMPREGNATED GAUZE DRESSING: Brand: CUTICERIN 7.5X7.5CM CTN 50

## (undated) DEVICE — INTENDED FOR TISSUE SEPARATION, AND OTHER PROCEDURES THAT REQUIRE A SHARP SURGICAL BLADE TO PUNCTURE OR CUT.: Brand: BARD-PARKER ® CARBON RIB-BACK BLADES

## (undated) DEVICE — PRECISION (9.0 X 0.51 X 31.0MM)

## (undated) DEVICE — Z DISCONTINUED BY MEDLINE USE 2711682 TRAY SKIN PREP DRY W/ PREM GLV

## (undated) DEVICE — KIT DRL TMPLT 15MM 18MM 20MM 25MM 30MM CORRESPONDING

## (undated) DEVICE — SUTURE VCRL SZ 2-0 L27IN ABSRB UD L26MM SH 1/2 CIR J417H

## (undated) DEVICE — STRIP SKIN CLSR W0.25XL4IN WHT SPUNBOUND FBR NYL HI ADH

## (undated) DEVICE — GLOVE BIOGEL POWDER FREE SZ 8